# Patient Record
Sex: FEMALE | Race: WHITE | NOT HISPANIC OR LATINO | Employment: FULL TIME | URBAN - METROPOLITAN AREA
[De-identification: names, ages, dates, MRNs, and addresses within clinical notes are randomized per-mention and may not be internally consistent; named-entity substitution may affect disease eponyms.]

---

## 2017-01-19 ENCOUNTER — ALLIED HEALTH/NURSE VISIT (OUTPATIENT)
Dept: CARDIOLOGY | Facility: CLINIC | Age: 62
End: 2017-01-19
Payer: COMMERCIAL

## 2017-01-19 DIAGNOSIS — Z95.0 CARDIAC PACEMAKER IN SITU: Primary | ICD-10-CM

## 2017-01-19 PROCEDURE — 93294 REM INTERROG EVL PM/LDLS PM: CPT | Performed by: INTERNAL MEDICINE

## 2017-01-19 PROCEDURE — 93296 REM INTERROG EVL PM/IDS: CPT | Performed by: INTERNAL MEDICINE

## 2017-01-20 NOTE — PROGRESS NOTES
Medtronic Adapta ADDRL1 (D) Remote PPM Device Check  AP: 24% : 1%  Mode: AAI <=> DDD        Presenting Rhythm: SR, vent rate 74bpm  Heart Rate: adequate heart rates per histogram  Sensing: stable    Pacing Threshold: stable    Impedance: stable  Battery Status: 8 - 11.5 years remaining  Atrial Arrhythmia: none  Ventricular Arrhythmia: none     Care Plan: F/U [facility] q 3 months. Mailed letter with results and next transmission date. Rosita ESPINOZA

## 2017-04-20 ENCOUNTER — ALLIED HEALTH/NURSE VISIT (OUTPATIENT)
Dept: CARDIOLOGY | Facility: CLINIC | Age: 62
End: 2017-04-20
Payer: COMMERCIAL

## 2017-04-20 DIAGNOSIS — Z95.0 CARDIAC PACEMAKER IN SITU: Primary | ICD-10-CM

## 2017-04-20 PROCEDURE — 93296 REM INTERROG EVL PM/IDS: CPT | Performed by: INTERNAL MEDICINE

## 2017-04-20 PROCEDURE — 93294 REM INTERROG EVL PM/LDLS PM: CPT | Performed by: INTERNAL MEDICINE

## 2017-04-20 NOTE — MR AVS SNAPSHOT
After Visit Summary   4/20/2017    Emmy Sawyer    MRN: [id number]           Patient Information     Date Of Birth          1955        Visit Information        Provider Department      4/20/2017 3:30 PM TINOCO TECH1 [facility] AT [place]        Today's Diagnoses     Cardiac pacemaker in situ    -  1       Follow-ups after your visit        Who to contact     If you have questions or need follow up information about today's clinic visit or your schedule please contact [facility] directly at [phone].  Normal or non-critical lab and imaging results will be communicated to you by Tasktop [employer]hart, letter or phone within 4 business days after the clinic has received the results. If you do not hear from us within 7 days, please contact the clinic through Transmode Systemst or phone. If you have a critical or abnormal lab result, we will notify you by phone as soon as possible.  Submit refill requests through Fididel or call your pharmacy and they will forward the refill request to us. Please allow 3 business days for your refill to be completed.          Additional Information About Your Visit        MyChart Information     Fididel gives you secure access to your electronic health record. If you see a primary care provider, you can also send messages to your care team and make appointments. If you have questions, please call your primary care clinic.  If you do not have a primary care provider, please call [phone] and they will assist you.        Care EveryWhere ID     This is your Care EveryWhere ID. This could be used by other organizations to access your [place] medical records  [id number]         Blood Pressure from Last 3 Encounters:   09/02/16 120/78   04/01/16 136/77   12/03/15 128/80    Weight from Last 3 Encounters:   09/02/16 106.1 kg (234 lb)   11/12/15 108.9 kg (240 lb)   08/25/15 110.7 kg (244 lb)              We Performed  the Following     INTERROGATION DEVICE EVAL REMOTE, PACER/ICD (42040)     PM DEVICE INTERROGATE REMOTE (18920)        Primary Care Provider Office Phone # Fax #    Hayley Hays [phone]       [facility] [address] [facility] [address]        Thank you!     Thank you for choosing [facility] PHYSIC[facility] HEART AT [place]  for your care. Our goal is always to provide you with excellent care. Hearing back from our patients is one way we can continue to improve our services. Please take a few minutes to complete the written survey that you may receive in the mail after your visit with us. Thank you!             Your Updated Medication List - Protect others around you: Learn how to safely use, store and throw away your medicines at www.disposemymeds.org.          This list is accurate as of: 4/20/17 11:59 PM.  Always use your most recent med list.                   Brand Name Dispense Instructions for use    CRESTOR 20 MG tablet   Generic drug:  rosuvastatin      Take 10 mg by mouth daily.       metoprolol 25 MG 24 hr tablet    TOPROL-XL     Take 25 mg by mouth At Bedtime.       omeprazole 20 MG CR capsule    priLOSEC     Take 20 mg by mouth daily.       order for DME      CPAP every night       vitamin D 2000 UNITS Caps      Take 1 tablet by mouth.       ZOLOFT 50 MG tablet   Generic drug:  sertraline      Take 50 mg by mouth At Bedtime.

## 2017-04-21 NOTE — PROGRESS NOTES
Medtronic Adapta ADDRL1 (D) Remote PPM Device Check  AP: 25% : 1%  Mode: AAI <=> DDD        Presenting Rhythm: SR, vent rate 90bpm  Heart Rate: adequate heart rates per histogram  Sensing: stable    Pacing Threshold: stable    Impedance: RA: stable RV: increased from last check (473ohms to 716ohms)  Battery Status: 8.5 - 12.5 years remaining  Atrial Arrhythmia: none  Ventricular Arrhythmia: none     Care Plan: Reviewed lead findings with Nathan ESCOBAR and will continue to monitor for now. F/U scheduled q 3 months. Mailed letter with results and next transmission date. Rosita ESPINOZA

## 2017-06-03 ENCOUNTER — HEALTH MAINTENANCE LETTER (OUTPATIENT)
Age: 62
End: 2017-06-03

## 2017-07-18 ENCOUNTER — HOSPITAL ENCOUNTER (OUTPATIENT)
Dept: MAMMOGRAPHY | Facility: CLINIC | Age: 62
Discharge: HOME | End: 2017-07-18
Attending: FAMILY MEDICINE | Admitting: FAMILY MEDICINE
Payer: COMMERCIAL

## 2017-07-18 DIAGNOSIS — Z12.31 VISIT FOR SCREENING MAMMOGRAM: ICD-10-CM

## 2017-07-18 PROCEDURE — 77063 BREAST TOMOSYNTHESIS BI: CPT

## 2017-07-27 ENCOUNTER — ALLIED HEALTH/NURSE VISIT (OUTPATIENT)
Dept: CARDIOLOGY | Facility: CLINIC | Age: 62
End: 2017-07-27
Payer: COMMERCIAL

## 2017-07-27 DIAGNOSIS — Z95.0 CARDIAC PACEMAKER IN SITU: Primary | ICD-10-CM

## 2017-07-27 PROCEDURE — 93296 REM INTERROG EVL PM/IDS: CPT | Performed by: INTERNAL MEDICINE

## 2017-07-27 NOTE — MR AVS SNAPSHOT
After Visit Summary   7/27/2017    Emmy Sawyer    MRN: [id number]           Patient Information     Date Of Birth          1955        Visit Information        Provider Department      7/27/2017 4:45 PM TINOCO TECH1 [facility]        Today's Diagnoses     Cardiac pacemaker in situ    -  1       Follow-ups after your visit        Additional Services     Follow-Up with Device Clinic                 Your next 10 appointments already scheduled     Jul 27, 2017  4:45 PM CDT   Remote PPM Check with TINOCO TECH1   [facility] ([facility])    [address]  [place] [id number] [phone]           This appointment is for a remote check of your pacemaker.  This is not an appointment at the office.            Sep 06, 2017  8:30 AM CDT   Return Visit with Agustin Madden MD   [facility] ([facility])    [address]  [place] [address] [phone]              Future tests that were ordered for you today     Open Future Orders        Priority Expected Expires Ordered    Follow-Up with Device Clinic Routine 10/27/2017 7/27/2018 7/27/2017            Who to contact     If you have questions or need follow up information about today's clinic visit or your schedule please contact [facility] directly at [phone].  Normal or non-critical lab and imaging results will be communicated to you by MyChart, letter or phone within 4 business days after the clinic has received the results. If you do not hear from us within 7 days, please contact the clinic through MyChart or phone. If you have a critical or abnormal lab result, we will notify you by phone as soon as possible.  Submit refill requests through Adwings or call your pharmacy and they will forward the refill request to us. Please allow  3 business days for your refill to be completed.          Additional Information About Your Visit        MyChart Information     "[employer]"hart gives you secure access to your electronic health record. If you see a primary care provider, you can also send messages to your care team and make appointments. If you have questions, please call your primary care clinic.  If you do not have a primary care provider, please call [phone] and they will assist you.        Care EveryWhere ID     This is your Care EveryWhere ID. This could be used by other organizations to access your [place] medical records  [id number]         Blood Pressure from Last 3 Encounters:   09/02/16 120/78   04/01/16 136/77   12/03/15 128/80    Weight from Last 3 Encounters:   09/02/16 106.1 kg (234 lb)   11/12/15 108.9 kg (240 lb)   08/25/15 110.7 kg (244 lb)              We Performed the Following     INTERROGATION DEVICE EVAL REMOTE, PACER/ICD (88183)     PM DEVICE INTERROGATE REMOTE (06636)        Primary Care Provider Office Phone # Fax #    Hayley Crystal Hays [phone]       [facility] [address] [address] [address]        Equal Access to Services     CHALINO MAYA : Hadii aad ku hadasho Soomaali, waaxda luqadaha, qaybta kaalmada adeegyada, selam harding. So [facility] [phone].    ATENCIÓN: Si habla español, tiene a king disposición servicios gratuitos de asistencia lingüística. Llame al [phone].    We comply with applicable federal civil rights laws and [place] laws. We do not discriminate on the basis of race, color, national origin, age, disability sex, sexual orientation or gender identity.            Thank you!     Thank you for choosing [facility] PHYSICIANS HEART AT [place]  for your care. Our goal is always to provide you with excellent care. Hearing back from our patients is one way we can continue to improve our services. Please take a few minutes to complete  the written survey that you may receive in the mail after your visit with us. Thank you!             Your Updated Medication List - Protect others around you: Learn how to safely use, store and throw away your medicines at www.disposemymeds.org.          This list is accurate as of: 7/27/17  8:25 AM.  Always use your most recent med list.                   Brand Name Dispense Instructions for use Diagnosis    CRESTOR 20 MG tablet   Generic drug:  rosuvastatin      Take 10 mg by mouth daily.        metoprolol 25 MG 24 hr tablet    TOPROL-XL     Take 25 mg by mouth At Bedtime.        omeprazole 20 MG CR capsule    priLOSEC     Take 20 mg by mouth daily.        order for DME      CPAP every night        vitamin D 2000 UNITS Caps      Take 1 tablet by mouth.        ZOLOFT 50 MG tablet   Generic drug:  sertraline      Take 50 mg by mouth At Bedtime.

## 2017-07-27 NOTE — PROGRESS NOTES
Medtronic Adapta (D) Remote PPM Device Check  AP: 25 % : 2 %  Mode: AAI<->DDD        Presenting Rhythm: AS/VS, [id number]  Heart Rate: Adequate rates per histogram  Sensing: Stable    Pacing Threshold: Stable    Impedance: Stable  Battery Status: 8-12.5 years  Atrial Arrhythmia: None  Ventricular Arrhythmia: 1 ventricular high rate. Marker only EGM shows As=Vs for PAT lasting 8 beats, rates 190bpm. Reviewed with SHAWN Doherty.     Care Plan: F/u annual threshold in 3 months. LM with results.  LORIN PhelanT

## 2017-09-06 ENCOUNTER — OFFICE VISIT (OUTPATIENT)
Dept: CARDIOLOGY | Facility: CLINIC | Age: 62
End: 2017-09-06
Attending: INTERNAL MEDICINE
Payer: COMMERCIAL

## 2017-09-06 VITALS
DIASTOLIC BLOOD PRESSURE: 64 MMHG | HEIGHT: 63 IN | HEART RATE: 60 BPM | WEIGHT: 238 LBS | BODY MASS INDEX: 42.17 KG/M2 | SYSTOLIC BLOOD PRESSURE: 118 MMHG

## 2017-09-06 DIAGNOSIS — I44.2 ATRIOVENTRICULAR BLOCK, COMPLETE (H): Primary | ICD-10-CM

## 2017-09-06 PROCEDURE — 99214 OFFICE O/P EST MOD 30 MIN: CPT | Performed by: INTERNAL MEDICINE

## 2017-09-06 NOTE — MR AVS SNAPSHOT
After Visit Summary   9/6/2017    Emmy Sawyer    MRN: [id number]           Patient Information     Date Of Birth          1955        Visit Information        Provider Department      9/6/2017 8:30 AM Agustin Madden MD [facility] HEART AT [place]        Today's Diagnoses     Atrioventricular block, complete (H)    -  1       Follow-ups after your visit        Additional Services     Follow-Up with Cardiologist                 Your next 10 appointments already scheduled     Oct 25, 2017  8:10 AM CDT   Pacemaker Check with RU DCR2   [facility] ([facility])    [address]  [place] [address] [phone]              Future tests that were ordered for you today     Open Future Orders        Priority Expected Expires Ordered    Follow-Up with Cardiologist Routine 9/6/2018 9/7/2018 9/6/2017            Who to contact     If you have questions or need follow up information about today's clinic visit or your schedule please contact [facility] directly at [phone].  Normal or non-critical lab and imaging results will be communicated to you by Message Bushart, letter or phone within 4 business days after the clinic has received the results. If you do not hear from us within 7 days, please contact the clinic through [employer] or phone. If you have a critical or abnormal lab result, we will notify you by phone as soon as possible.  Submit refill requests through Traxian or call your pharmacy and they will forward the refill request to us. Please allow 3 business days for your refill to be completed.          Additional Information About Your Visit        Message Bushart Information     Traxian gives you secure access to your electronic health record. If you see a primary care provider, you can also send messages to your care team and make appointments. If you have questions,  "please call your primary care clinic.  If you do not have a primary care provider, please call [phone] and they will assist you.        Care EveryWhere ID     This is your Care EveryWhere ID. This could be used by other organizations to access your [place] medical records  [id number]        Your Vitals Were     Pulse Height BMI (Body Mass Index)             60 1.6 m (5' 3\") 42.16 kg/m2          Blood Pressure from Last 3 Encounters:   09/06/17 118/64   09/02/16 120/78   04/01/16 136/77    Weight from Last 3 Encounters:   09/06/17 108 kg (238 lb)   09/02/16 106.1 kg (234 lb)   11/12/15 108.9 kg (240 lb)              We Performed the Following     Follow-Up with Cardiologist        Primary Care Provider Office Phone # Fax #    Hayley Crystal Hays [phone]       [facility] [address] [address]        Equal Access to Services     MATTEO MAYA : Hadii aad ku hadasho Soomaali, waaxda luqadaha, qaybta kaalmada adeegyada, waxay idiin hayalexn harlan bello . So [facility] [phone].    ATENCIÓN: Si habla español, tiene a king disposición servicios gratuitos de asistencia lingüística. Ll[facility] [phone].    We comply with applicable federal civil rights laws and [place] laws. We do not discriminate on the basis of race, color, national origin, age, disability sex, sexual orientation or gender identity.            Thank you!     Thank you for choosing [facility] PHYSICIANS HEART AT [place]  for your care. Our goal is always to provide you with excellent care. Hearing back from our patients is one way we can continue to improve our services. Please take a few minutes to complete the written survey that you may receive in the mail after your visit with us. Thank you!             Your Updated Medication List - Protect others around you: Learn how to safely use, store and throw away your medicines at www.disposemymeds.org.          This list is accurate as of: " 9/6/17  9:15 AM.  Always use your most recent med list.                   Brand Name Dispense Instructions for use Diagnosis    CRESTOR 20 MG tablet   Generic drug:  rosuvastatin      Take 10 mg by mouth daily.        metoprolol 25 MG 24 hr tablet    TOPROL-XL     Take 25 mg by mouth At Bedtime.        omeprazole 20 MG CR capsule    priLOSEC     Take 20 mg by mouth daily.        order for DME      CPAP every night        vitamin D 2000 UNITS Caps      Take 1 tablet by mouth.        ZOLOFT 50 MG tablet   Generic drug:  sertraline      Take 50 mg by mouth At Bedtime.

## 2017-09-06 NOTE — LETTER
9/6/2017    Hayley Hays MD  [employer]   [address] [address]    RE: Emmy Sawyer       Dear Colleague,    I had the pleasure of seeing Emym Sawyer in the [facility].    I had the pleasure of following up on our mutual patient, Emmy Sawyer.  She is a delightful 62-year-old woman.  As you know, we met her decade and a half ago when she presented with chest pain which was either noncardiac or possibly vasospasm or possibly esophageal symptoms.  Coronary angiogram showed normal heart and she had second-degree AV block.  She had a pacemaker placed.        We reviewed her pacer chart today.  Interestingly for years now, she has been using the atrial channel about 25% of the time and only using the ventricular channel a few percent of the time, even though one would expect a more ventricular pacing.  It suggested her AV conduction has improved.        She also is bothered by the PVCs.  We tried hard not to treat them above and beyond low dose beta blocker.  I told her if she is having a bad day, she can take an additional metoprolol.  I did review with her the flecainide trial and I explained to her that there is a black box warning for treating asymptomatic PVCs, although that applied only to patients who have had a heart attack and she probably would be okay with a type 1C antiarrhythmic, but I would only do that if her PVCs become more and more bothersome or she becomes more and more lightheaded when they are grouped together.  According the pacer she is not having any ventricular tachycardia.        We also briefly talked about EP study with PVC ablation focally if necessary.        I also was able access your lab tests.  I went through the cardiovascular 10-year risk calculator with her.  I know that she has impaired fasting glucose and I see that the hemoglobin A1c is only mildly elevated at 6.1%.  If we choose to call this diabetes, then her  10-year risk is actually 7.5%, which puts her at higher risk.  Again this is making the assumption that we are going to call it impaired fasting glucose diabetes.  In that case, I would actually try to get the LDL closer to 100 with a goal of ideally 70.  I know I sent a letter to you a couple years ago talking about this and her LDL actually did improve from that note 2 years ago to now.  I am going to have the patient give you a call and seek your opinions if you want to try increasing Crestor to see if we can get the LDL a little bit closer to 100.        Otherwise, I do not have any additional recommendations now.  She also asked about aspirin.  I reviewed with her the Physicians' Health Study and the Nurse Study.  The Nurse Study suggested little benefit for aspirin in female patient was under 65 unless they have additional risk factors.  In her case, she does have cholesterol and blood pressure and now perhaps diabetes, and so we could make a case for a low dose baby aspirin.        Today's visit ended up being 45 minutes, greater than 50% counseling.      I will see her back in 1 year for a pacer check and I will kindly ask your opinion and have you weigh in on the idea of an aspirin and higher dose Crestor.      Thank you for allowing me to see Ms. Sawyer.     Sincerely,    Agustin Madden MD     [facility]

## 2017-09-06 NOTE — PROGRESS NOTES
HISTORY OF PRESENT ILLNESS:  I had the pleasure of following up on our mutual patient, Emmy Sawyer.  She is a delightful 62-year-old woman.  As you know, we met her decade and a half ago when she presented with chest pain which was either noncardiac or possibly vasospasm or possibly esophageal symptoms.  Coronary angiogram showed normal heart and she had second-degree AV block.  She had a pacemaker placed.        We reviewed her pacer chart today.  Interestingly for years now, she has been using the atrial channel about 25% of the time and only using the ventricular channel a few percent of the time, even though one would expect a more ventricular pacing.  It suggested her AV conduction has improved.        She also is bothered by the PVCs.  We tried hard not to treat them above and beyond low dose beta blocker.  I told her if she is having a bad day, she can take an additional metoprolol.  I did review with her the flecainide trial and I explained to her that there is a black box warning for treating asymptomatic PVCs, although that applied only to patients who have had a heart attack and she probably would be okay with a type 1C antiarrhythmic, but I would only do that if her PVCs become more and more bothersome or she becomes more and more lightheaded when they are grouped together.  According the pacer she is not having any ventricular tachycardia.        We also briefly talked about EP study with PVC ablation focally if necessary.        I also was able access your lab tests.  I went through the cardiovascular 10-year risk calculator with her.  I know that she has impaired fasting glucose and I see that the hemoglobin A1c is only mildly elevated at 6.1%.  If we choose to call this diabetes, then her 10-year risk is actually 7.5%, which puts her at higher risk.  Again this is making the assumption that we are going to call it impaired fasting glucose diabetes.  In that case, I would actually try to get the  LDL closer to 100 with a goal of ideally 70.  I know I sent a letter to you a couple years ago talking about this and her LDL actually did improve from that note 2 years ago to now.  I am going to have the patient give you a call and seek your opinions if you want to try increasing Crestor to see if we can get the LDL a little bit closer to 100.        Otherwise, I do not have any additional recommendations now.  She also asked about aspirin.  I reviewed with her the Physicians' Health Study and the Nurse Study.  The Nurse Study suggested little benefit for aspirin in female patient was under 65 unless they have additional risk factors.  In her case, she does have cholesterol and blood pressure and now perhaps diabetes, and so we could make a case for a low dose baby aspirin.        Today's visit ended up being 45 minutes, greater than 50% counseling.      I will see her back in 1 year for a pacer check and I will kindly ask your opinion and have you weigh in on the idea of an aspirin and higher dose Crestor.      Thank you for allowing me to see Ms. Shearer.      cc:      [place] [address]         GEOVANNA ALANIZ MD             D: 2017 09:15   T: 2017 11:10   MT: TUNG      Name:     DEBORAH SHEARER   MRN:      [id number]        Account:      [id number]   :      1955           Service Date: 2017      Document: [id number]

## 2017-09-06 NOTE — PROGRESS NOTES
HPI and Plan:   See dictation    Orders Placed This Encounter   Procedures     Follow-Up with Cardiologist     No orders of the defined types were placed in this encounter.    There are no discontinued medications.      Encounter Diagnosis   Name Primary?     Atrioventricular block, complete (H) Yes       CURRENT MEDICATIONS:  Current Outpatient Prescriptions   Medication Sig Dispense Refill     order for DME CPAP every night       rosuvastatin (CRESTOR) 20 MG tablet Take 10 mg by mouth daily.       metoprolol (TOPROL-XL) 25 MG 24 hr tablet Take 25 mg by mouth At Bedtime.       omeprazole (PRILOSEC) 20 MG capsule Take 20 mg by mouth daily.       Cholecalciferol (VITAMIN D) 2000 UNIT CAPS Take 1 tablet by mouth.       sertraline (ZOLOFT) 50 MG tablet Take 50 mg by mouth At Bedtime.         ALLERGIES     Allergies   Allergen Reactions     Penicillins Rash       PAST MEDICAL HISTORY:  Past Medical History:   Diagnosis Date     Angina pectoris     normal cor artery-?syndrome X, ?Prinzmetal's     Anxiety      Arrhythmia     PVC     Costochondritis      Depression      GERD (gastroesophageal reflux disease)     normal EGD     Hyperlipidaemia      Hypertension      Impaired fasting glucose      Migraines      Pacemaker     Medtronic manny mclaughlin pacemaker serial [id number]     Second degree heart block 2003    Medtronic manny mclaughlin pacemaker serial [id number]     Shingles      Sleep apnea     CPAP       PAST SURGICAL HISTORY:  Past Surgical History:   Procedure Laterality Date     DILATION AND CURETTAGE  1/13/2012    Procedure:DILATION AND CURETTAGE; DILATION AND CURETTAGE ; Surgeon:NIKI QUEEN; Location:[facility] OR     H PERM PACER DBLE LEAD       IMPLANT PACEMAKER  2003     LAPAROSCOPIC HYSTERECTOMY TOTAL  2/13/2012    Procedure:LAPAROSCOPIC HYSTERECTOMY TOTAL; LAPAROSCOPIC TOTAL HYSTERECTOMY , BILATERAL SALPINGO-OOPHERECTOMY; Surgeon:NIKI QUEEN; Location:[facility] OR     TUBAL LIGATION         FAMILY HISTORY:  Family History  "  Problem Relation Age of Onset     Hypertension Mother      Hyperlipidemia Mother      Hypertension Father      DIABETES Father      Hyperlipidemia Father      Myocardial Infarction Father 84     Myocardial Infarction Maternal Grandfather      Myocardial Infarction Paternal Grandmother      HEART DISEASE Daughter      minor MVP       SOCIAL HISTORY:  Social History     Social History     Marital status:      Spouse name: N/A     Number of children: N/A     Years of education: N/A     Social History Main Topics     Smoking status: Former Smoker     Quit date: 1/9/2008     Smokeless tobacco: Never Used     Alcohol use Yes      Comment: 2 drinks a month     Drug use: No     Sexual activity: Not Asked     Other Topics Concern     Caffeine Concern No     1-2 cups per day     Sleep Concern Yes     sleep apnea , cpap     Weight Concern No     Special Diet No     Exercise Yes     some walking 2-3 days a week     Seat Belt Yes     Social History Narrative       Review of Systems:  Skin:  Negative for     Eyes:  Negative for    ENT:  Negative for    Respiratory:  Negative for sleep apnea;CPAP  Cardiovascular:    Positive for;lightheadedness;fatigue  Gastroenterology: Positive for reflux  Genitourinary:  Negative for    Musculoskeletal:  Negative for    Neurologic:  Positive for numbness or tingling of hands  Psychiatric:  Negative for    Heme/Lymph/Imm:  Negative    Endocrine:  Positive for      Physical Exam:  Vitals: /64  Pulse 60  Ht 1.6 m (5' 3\")  Wt 108 kg (238 lb)  BMI 42.16 kg/m2    Constitutional:  cooperative, alert and oriented, well developed, well nourished, in no acute distress        Skin:  warm and dry to the touch, no apparent skin lesions or masses noted        Head:  normocephalic, no masses or lesions        Eyes:  pupils equal and round, conjunctivae and lids unremarkable, sclera white, no xanthalasma, EOMS intact, no nystagmus        ENT:  no pallor or cyanosis, dentition good    "     Neck:  carotid pulses are full and equal bilaterally, JVP normal, no carotid bruit, no thyromegaly        Chest:  normal breath sounds, clear to auscultation, normal A-P diameter, normal symmetry, normal respiratory excursion, no use of accessory muscles          Cardiac: regular rhythm, normal S1/S2, no S3 or S4, apical impulse not displaced, no murmurs, gallops or rubs                  Abdomen:  abdomen soft, non-tender, BS normoactive, no mass, no HSM, no bruits obese      Vascular: pulses full and equal, no bruits auscultated                                        Extremities and Back:  no deformities, clubbing, cyanosis, erythema observed   bilateral LE edema;trace          Neurological:  affect appropriate, oriented to time, person and place          Recent Lab Results:  LIPID RESULTS:  Lab Results   Component Value Date    CHOL 232 (A) 06/19/2015    HDL 57 06/19/2015     06/19/2015    TRIG 188 06/19/2015    CHOLHDLRATIO 3.68 05/30/2014       LIVER ENZYME RESULTS:  Lab Results   Component Value Date    AST 16 04/01/2016    ALT 29 04/01/2016       CBC RESULTS:  Lab Results   Component Value Date    WBC 9.1 04/01/2016    RBC 4.48 04/01/2016    HGB 12.5 04/01/2016    HCT 38.7 04/01/2016    MCV 86 04/01/2016    MCH 27.9 04/01/2016    MCHC 32.3 04/01/2016    RDW 13.3 04/01/2016     04/01/2016       BMP RESULTS:  Lab Results   Component Value Date     04/01/2016    POTASSIUM 4.5 04/01/2016    CHLORIDE 106 04/01/2016    CO2 28 04/01/2016    ANIONGAP 5 04/01/2016    GLC 90 04/01/2016    BUN 13 04/01/2016    CR 0.76 04/01/2016    GFRESTIMATED 64 04/01/2016    GFRESTBLACK 78 04/01/2016    REGLA 9.0 04/01/2016        A1C RESULTS:  No results found for: A1C    INR RESULTS:  Lab Results   Component Value Date    INR 0.98 12/06/2011           CC  Agustin Madden MD  [address] [place]  [place] [address]

## 2017-10-25 ENCOUNTER — ALLIED HEALTH/NURSE VISIT (OUTPATIENT)
Dept: CARDIOLOGY | Facility: CLINIC | Age: 62
End: 2017-10-25
Payer: COMMERCIAL

## 2017-10-25 DIAGNOSIS — Z95.0 CARDIAC PACEMAKER IN SITU: ICD-10-CM

## 2017-10-25 PROCEDURE — 93280 PM DEVICE PROGR EVAL DUAL: CPT | Performed by: INTERNAL MEDICINE

## 2017-10-25 NOTE — MR AVS SNAPSHOT
After Visit Summary   10/25/2017    Emmy Sawyer    MRN: [id number]           Patient Information     Date Of Birth          1955        Visit Information        Provider Department      10/25/2017 8:10 AM RU DCR2 [facility]        Today's Diagnoses     Cardiac pacemaker in situ           Follow-ups after your visit        Your next 10 appointments already scheduled     Jan 22, 2018  9:00 AM CST   Remote PPM Check with TINOCO TECH1   [facility] ([facility])    [address] [place], [place] [id number]  [facility] [address] [phone]           This appointment is for a remote check of your pacemaker.  This is not an appointment at the office.              Who to contact     If you have questions or need follow up information about today's clinic visit or your schedule please contact [facility] directly at [phone].  Normal or non-critical lab and imaging results will be communicated to you by [employer], letter or phone within 4 business days after the clinic has received the results. If you do not hear from us within 7 days, please contact the clinic through [employer] or phone. If you have a critical or abnormal lab result, we will notify you by phone as soon as possible.  Submit refill requests through [employer] or call your pharmacy and they will forward the refill request to us. Please allow 3 business days for your refill to be completed.          Additional Information About Your Visit        MyChart Information     [employer] gives you secure access to your electronic health record. If you see a primary care provider, you can also send messages to your care team and make appointments. If you have questions, please call your primary care clinic.  If you do not have a primary care provider, please call [phone] and they will assist you.        Care EveryWhere ID      This is your Care EveryWhere ID. This could be used by other organizations to access your [place] medical records  [id number]         Blood Pressure from Last 3 Encounters:   09/06/17 118/64   09/02/16 120/78   04/01/16 136/77    Weight from Last 3 Encounters:   09/06/17 108 kg (238 lb)   09/02/16 106.1 kg (234 lb)   11/12/15 108.9 kg (240 lb)              We Performed the Following     Follow-Up with Device Clinic     PM DEVICE PROGRAMMING EVAL, DUAL LEAD PACER (44374)        Primary Care Provider Office Phone # Fax #    Hayley Hays [phone]       [facility] [address] [facility] [address]        Equal Access to Services     CHALINO MAYA : Hadii [place] gold hadadilsnoo Soronit, waaxda luqadaha, qaybta kaalmada adeegyada, selam bello . So [facility] [phone].    ATENCIÓN: Si habla español, tiene a king disposición servicios gratuitos de asistencia lingüística. Llame al [phone].    We comply with applicable federal civil rights laws and [place] laws. We do not discriminate on the basis of race, color, national origin, age, disability, sex, sexual orientation, or gender identity.            Thank you!     Thank you for choosing [facility]  for your care. Our goal is always to provide you with excellent care. Hearing back from our patients is one way we can continue to improve our services. Please take a few minutes to complete the written survey that you may receive in the mail after your visit with us. Thank you!             Your Updated Medication List - Protect others around you: Learn how to safely use, store and throw away your medicines at www.disposemymeds.org.          This list is accurate as of: 10/25/17  8:12 AM.  Always use your most recent med list.                   Brand Name Dispense Instructions for use Diagnosis    CRESTOR 20 MG tablet   Generic drug:  rosuvastatin      Take 10 mg by mouth  daily.        metoprolol 25 MG 24 hr tablet    TOPROL-XL     Take 25 mg by mouth At Bedtime.        omeprazole 20 MG CR capsule    priLOSEC     Take 20 mg by mouth daily.        order for DME      CPAP every night        vitamin D 2000 UNITS Caps      Take 1 tablet by mouth.        ZOLOFT 50 MG tablet   Generic drug:  sertraline      Take 50 mg by mouth At Bedtime.

## 2017-10-25 NOTE — PROGRESS NOTES
Medtronic Adapta Pacemaker Device Check  AP: 25 % : 2 %  Mode: AAI- DDD        Underlying Rhythm: SR with PVCs  Heart Rate: Adequate variation per histogram   Sensing: stable    Pacing Threshold: stable   Impedance: stable  Battery Status: 10 years  Atrial Arrhythmia: none  Ventricular Arrhythmia: none  Setting Change: none    Care Plan: f/u 3 months remote PPM check. Shiraz

## 2018-01-22 ENCOUNTER — ALLIED HEALTH/NURSE VISIT (OUTPATIENT)
Dept: CARDIOLOGY | Facility: CLINIC | Age: 63
End: 2018-01-22
Payer: COMMERCIAL

## 2018-01-22 DIAGNOSIS — Z95.0 CARDIAC PACEMAKER IN SITU: Primary | ICD-10-CM

## 2018-01-22 PROCEDURE — 93294 REM INTERROG EVL PM/LDLS PM: CPT | Performed by: INTERNAL MEDICINE

## 2018-01-22 PROCEDURE — 93296 REM INTERROG EVL PM/IDS: CPT | Performed by: INTERNAL MEDICINE

## 2018-01-22 NOTE — MR AVS SNAPSHOT
After Visit Summary   1/22/2018    Emmy Sawyer    MRN: [id number]           Patient Information     Date Of Birth          1955        Visit Information        Provider Department      1/22/2018 9:00 AM TINOCO TECH1 [facility]        Today's Diagnoses     Cardiac pacemaker in situ    -  1       Follow-ups after your visit        Who to contact     If you have questions or need follow up information about today's clinic visit or your schedule please contact [facility] directly at [phone].  Normal or non-critical lab and imaging results will be communicated to you by [employer], letter or phone within 4 business days after the clinic has received the results. If you do not hear from us within 7 days, please contact the clinic through [employer] or phone. If you have a critical or abnormal lab result, we will notify you by phone as soon as possible.  Submit refill requests through [employer] or call your pharmacy and they will forward the refill request to us. Please allow 3 business days for your refill to be completed.          Additional Information About Your Visit        MyChart Information     [employer] gives you secure access to your electronic health record. If you see a primary care provider, you can also send messages to your care team and make appointments. If you have questions, please call your primary care clinic.  If you do not have a primary care provider, please call [phone] and they will assist you.        Care EveryWhere ID     This is your Care EveryWhere ID. This could be used by other organizations to access your [place] medical records  [id number]         Blood Pressure from Last 3 Encounters:   09/06/17 118/64   09/02/16 120/78   04/01/16 136/77    Weight from Last 3 Encounters:   09/06/17 108 kg (238 lb)   09/02/16 106.1 kg (234 lb)   11/12/15 108.9 kg (240 lb)              We Performed the  Following     INTERROGATION DEVICE EVAL REMOTE, PACER/ICD (04870)     PM DEVICE INTERROGATE REMOTE (10242)        Primary Care Provider Office Phone # Fax #    Hayley Hays [phone]       [facility] [address] [facility] [address]        Equal Access to Services     [facility] Center[facility] : Hadii aad ku hadasho Soomaali, waaxda luqadaha, qaybta kaalmada adeegyada, waxay idiin hayaan adeeg khelianash la'aan . So [facility] [phone].    ATENCIÓN: Si habla español, tiene a king disposición servicios gratuitos de asistencia lingüística. Broame al [phone].    We comply with applicable federal civil rights laws and [place] laws. We do not discriminate on the basis of race, color, national origin, age, disability, sex, sexual orientation, or gender identity.            Thank you!     Thank you for choosing [facility]  for your care. Our goal is always to provide you with excellent care. Hearing back from our patients is one way we can continue to improve our services. Please take a few minutes to complete the written survey that you may receive in the mail after your visit with us. Thank you!             Your Updated Medication List - Protect others around you: Learn how to safely use, store and throw away your medicines at www.disposemymeds.org.          This list is accurate as of: 1/22/18 11:57 AM.  Always use your most recent med list.                   Brand Name Dispense Instructions for use Diagnosis    CRESTOR 20 MG tablet   Generic drug:  rosuvastatin      Take 10 mg by mouth daily.        metoprolol succinate 25 MG 24 hr tablet    TOPROL-XL     Take 25 mg by mouth At Bedtime.        omeprazole 20 MG CR capsule    priLOSEC     Take 20 mg by mouth daily.        order for DME      CPAP every night        vitamin D 2000 UNITS Caps      Take 1 tablet by mouth.        ZOLOFT 50 MG tablet   Generic drug:  sertraline      Take 50 mg by mouth At Bedtime.

## 2018-01-22 NOTE — PROGRESS NOTES
Medtronic Adapta (D) Remote PPM Device Check  AP: 26 % : 1 %  Mode: AAI<->DDD        Presenting Rhythm: AS/VS, rates 67 bpm  Heart Rate: Adequate rates per histogram  Sensing: Stable    Pacing Threshold: Stable    Impedance: Stable  Battery Status: 7.5-12 years  Atrial Arrhythmia: None  Ventricular Arrhythmia: None     Care Plan: F/u PPM Carelink q 3 months. LM with results. LORIN PhelanT

## 2018-04-30 ENCOUNTER — ALLIED HEALTH/NURSE VISIT (OUTPATIENT)
Dept: CARDIOLOGY | Facility: CLINIC | Age: 63
End: 2018-04-30
Payer: COMMERCIAL

## 2018-04-30 DIAGNOSIS — Z95.0 CARDIAC PACEMAKER IN SITU: Primary | ICD-10-CM

## 2018-04-30 PROCEDURE — 93296 REM INTERROG EVL PM/IDS: CPT | Performed by: INTERNAL MEDICINE

## 2018-04-30 PROCEDURE — 93294 REM INTERROG EVL PM/LDLS PM: CPT | Performed by: INTERNAL MEDICINE

## 2018-04-30 NOTE — PROGRESS NOTES
Medtronic Adapta (D) Remote PPM Device Check  AP: 25 % : 2 %  Mode: AAI<->DDD        Presenting Rhythm: AS/VS, rates 59-69bpm  Heart Rate: Adequate rates per histogram  Sensing: Stable    Pacing Threshold: Stable    Impedance: Stable  Battery Status: 7.5-11.5 years  Atrial Arrhythmia: None  Ventricular Arrhythmia: None     Care Plan: F/u PPM Carelink q 3 months. LM with results. LORIN PhelanT

## 2018-04-30 NOTE — MR AVS SNAPSHOT
After Visit Summary   4/30/2018    Emmy Sawyer    MRN: [id number]           Patient Information     Date Of Birth          1955        Visit Information        Provider Department      4/30/2018 1:30 PM ALEJANDRINA PAL [facility]        Today's Diagnoses     Cardiac pacemaker in situ    -  1       Follow-ups after your visit        Your next 10 appointments already scheduled     Apr 30, 2018  1:30 PM CDT   Remote PPM Check with ALEJANDRINA PAL   [facility]   [place] ([facility])    [address] [place] [address] [address]  [facility] [address] [phone] OPT 2           This appointment is for a remote check of your pacemaker.  This is not an appointment at the office.              Who to contact     If you have questions or need follow up information about today's clinic visit or your schedule please contact [facility] directly at [phone].  Normal or non-critical lab and imaging results will be communicated to you by [employer], letter or phone within 4 business days after the clinic has received the results. If you do not hear from us within 7 days, please contact the clinic through [employer] or phone. If you have a critical or abnormal lab result, we will notify you by phone as soon as possible.  Submit refill requests through [employer] or call your pharmacy and they will forward the refill request to us. Please allow 3 business days for your refill to be completed.          Additional Information About Your Visit        MyChart Information     [employer] gives you secure access to your electronic health record. If you see a primary care provider, you can also send messages to your care team and make appointments. If you have questions, please call your primary care clinic.  If you do not have a primary care provider, please call [phone] and they will assist you.        Care EveryWhere ID      This is your Care EveryWhere ID. This could be used by other organizations to access your [place] medical records  [id number]         Blood Pressure from Last 3 Encounters:   09/06/17 118/64   09/02/16 120/78   04/01/16 136/77    Weight from Last 3 Encounters:   09/06/17 108 kg (238 lb)   09/02/16 106.1 kg (234 lb)   11/12/15 108.9 kg (240 lb)              We Performed the Following     INTERROGATION DEVICE EVAL REMOTE, PACER/ICD (55013)     PM DEVICE INTERROGATE REMOTE (82199)        Primary Care Provider Office Phone # Fax #    Hayley Crystal Hays [phone]       [facility] [address] [facility] [address]        Equal Access to Services     CHALINO MAYA : Hadii alban[place] gold hadasho Soomaali, waaxda luqadaha, qaybta kaalmada adeegyada, waxyvette bello . So [facility] [phone].    ATENCIÓN: Si habla español, tiene a king disposición servicios gratuitos de asistencia lingüística. LlSelect [facility]ty [facility] [phone].    We comply with applicable federal civil rights laws and [place] laws. We do not discriminate on the basis of race, color, national origin, age, disability, sex, sexual orientation, or gender identity.            Thank you!     Thank you for choosing [facility]  for your care. Our goal is always to provide you with excellent care. Hearing back from our patients is one way we can continue to improve our services. Please take a few minutes to complete the written survey that you may receive in the mail after your visit with us. Thank you!             Your Updated Medication List - Protect others around you: Learn how to safely use, store and throw away your medicines at www.disposemymeds.org.          This list is accurate as of 4/30/18  8:48 AM.  Always use your most recent med list.                   Brand Name Dispense Instructions for use Diagnosis    CRESTOR 20 MG tablet   Generic drug:  rosuvastatin      Take 10 mg by mouth  daily.        metoprolol succinate 25 MG 24 hr tablet    TOPROL-XL     Take 25 mg by mouth At Bedtime.        omeprazole 20 MG CR capsule    priLOSEC     Take 20 mg by mouth daily.        order for DME      CPAP every night        vitamin D 2000 units Caps      Take 1 tablet by mouth.        ZOLOFT 50 MG tablet   Generic drug:  sertraline      Take 50 mg by mouth At Bedtime.

## 2018-07-20 ENCOUNTER — HOSPITAL ENCOUNTER (OUTPATIENT)
Dept: MAMMOGRAPHY | Facility: CLINIC | Age: 63
Discharge: HOME | End: 2018-07-20
Attending: FAMILY MEDICINE | Admitting: FAMILY MEDICINE
Payer: COMMERCIAL

## 2018-07-20 DIAGNOSIS — Z12.31 VISIT FOR SCREENING MAMMOGRAM: ICD-10-CM

## 2018-07-20 PROCEDURE — 77063 BREAST TOMOSYNTHESIS BI: CPT

## 2018-08-06 ENCOUNTER — ALLIED HEALTH/NURSE VISIT (OUTPATIENT)
Dept: CARDIOLOGY | Facility: CLINIC | Age: 63
End: 2018-08-06
Payer: COMMERCIAL

## 2018-08-06 DIAGNOSIS — Z95.0 CARDIAC PACEMAKER IN SITU: Primary | ICD-10-CM

## 2018-08-06 PROCEDURE — 93294 REM INTERROG EVL PM/LDLS PM: CPT | Performed by: INTERNAL MEDICINE

## 2018-08-06 PROCEDURE — 93296 REM INTERROG EVL PM/IDS: CPT | Performed by: INTERNAL MEDICINE

## 2018-08-06 NOTE — MR AVS SNAPSHOT
After Visit Summary   8/6/2018    Emmy Sawyer    MRN: [id number]           Patient Information     Date Of Birth          1955        Visit Information        Provider Department      8/6/2018 10:45 AM [facility] TE[facility]        Today's Diagnoses     Cardiac pacemaker in situ    -  1       Follow-ups after your visit        Additional Services     Follow-Up with Device Clinic                 Your next 10 appointments already scheduled     Sep 05, 2018  8:00 AM CDT   Return Visit with Agustin Madden MD   [facility] ([facility])    [address]  [facility] [place] [id number] [phone]              Future tests that were ordered for you today     Open Future Orders        Priority Expected Expires Ordered    Follow-Up with Device Clinic Routine 11/6/2018 8/6/2019 8/6/2018            Who to contact     If you have questions or need follow up information about today's clinic visit or your schedule please contact [facility] directly at [phone].  Normal or non-critical lab and imaging results will be communicated to you by [employer], letter or phone within 4 business days after the clinic has received the results. If you do not hear from us within 7 days, please contact the clinic through [employer] or phone. If you have a critical or abnormal lab result, we will notify you by phone as soon as possible.  Submit refill requests through [employer] or call your pharmacy and they will forward the refill request to us. Please allow 3 business days for your refill to be completed.          Additional Information About Your Visit        MyChart Information     [employer] gives you secure access to your electronic health record. If you see a primary care provider, you can also send messages to your care team and make appointments. If you have questions, please call  your primary care clinic.  If you do not have a primary care provider, please call [phone] and they will assist you.        Care EveryWhere ID     This is your Care EveryWhere ID. This could be used by other organizations to access your [place] medical records  [id number]         Blood Pressure from Last 3 Encounters:   09/06/17 118/64   09/02/16 120/78   04/01/16 136/77    Weight from Last 3 Encounters:   09/06/17 108 kg (238 lb)   09/02/16 106.1 kg (234 lb)   11/12/15 108.9 kg (240 lb)              We Performed the Following     INTERROGATION DEVICE EVAL REMOTE, PACER/ICD (01594)     PM DEVICE INTERROGATE REMOTE (10660)        Primary Care Provider Office Phone # Fax #    Hayley Crystal Hays [phone]       [facility] [address] [address] [facility] [address]        Equal Access to Services     MATTEO MAYA : Hadii aad ku hadasho Soomaali, waaxda luqadaha, qaybta kaalmada adeegyada, waxay idiin hayalexn harlan kharahelga bello . So [facility] [phone].    ATENCIÓN: Si habla español, tiene a king disposición servicios gratuitos de asistencia lingüística. Llame al [phone].    We comply with applicable federal civil rights laws and [place] laws. We do not discriminate on the basis of race, color, national origin, age, disability, sex, sexual orientation, or gender identity.            Thank you!     Thank you for choosing [facility]  for your care. Our goal is always to provide you with excellent care. Hearing back from our patients is one way we can continue to improve our services. Please take a few minutes to complete the written survey that you may receive in the mail after your visit with us. Thank you!             Your Updated Medication List - Protect others around you: Learn how to safely use, store and throw away your medicines at www.disposemymeds.org.          This list is accurate as of 8/6/18 10:51 AM.  Always use your most recent med list.                    Brand Name Dispense Instructions for use Diagnosis    CRESTOR 20 MG tablet   Generic drug:  rosuvastatin      Take 10 mg by mouth daily.        metoprolol succinate 25 MG 24 hr tablet    TOPROL-XL     Take 25 mg by mouth At Bedtime.        omeprazole 20 MG CR capsule    priLOSEC     Take 20 mg by mouth daily.        order for DME      CPAP every night        vitamin D 2000 units Caps      Take 1 tablet by mouth.        ZOLOFT 50 MG tablet   Generic drug:  sertraline      Take 50 mg by mouth At Bedtime.

## 2018-08-06 NOTE — PROGRESS NOTES
Medtronic Adapta (D) Remote PPM Device Check  AP: 26 % : 2.5 %  Mode: AAI<->DDD        Presenting Rhythm: AS/VS, rates 68-74bpm  Heart Rate: Adequate rates per histogram  Sensing: Stable    Pacing Threshold: Stable    Impedance: Stable  Battery Status: 7-11.5 years  Atrial Arrhythmia: None  Ventricular Arrhythmia: None     Care Plan: F/u annual threshold in 3 months, order placed. LM with results.TapanCVT

## 2018-09-05 ENCOUNTER — OFFICE VISIT (OUTPATIENT)
Dept: CARDIOLOGY | Facility: CLINIC | Age: 63
End: 2018-09-05
Payer: COMMERCIAL

## 2018-09-05 VITALS
WEIGHT: 241 LBS | OXYGEN SATURATION: 96 % | SYSTOLIC BLOOD PRESSURE: 118 MMHG | HEIGHT: 63 IN | DIASTOLIC BLOOD PRESSURE: 74 MMHG | BODY MASS INDEX: 42.7 KG/M2 | HEART RATE: 69 BPM

## 2018-09-05 DIAGNOSIS — I44.2 ATRIOVENTRICULAR BLOCK, COMPLETE (H): ICD-10-CM

## 2018-09-05 PROCEDURE — 99214 OFFICE O/P EST MOD 30 MIN: CPT | Performed by: INTERNAL MEDICINE

## 2018-09-05 RX ORDER — CYCLOSPORINE 0.5 MG/ML
1 EMULSION OPHTHALMIC 2 TIMES DAILY
COMMUNITY
End: 2023-06-08

## 2018-09-05 NOTE — PROGRESS NOTES
HPI and Plan:   See dictation    Orders Placed This Encounter   Procedures     Follow-Up with Cardiologist     Orders Placed This Encounter   Medications     cycloSPORINE (RESTASIS) 0.05 % ophthalmic emulsion     Si drop 2 times daily     There are no discontinued medications.      Encounter Diagnosis   Name Primary?     Atrioventricular block, complete (H)        CURRENT MEDICATIONS:  Current Outpatient Prescriptions   Medication Sig Dispense Refill     Cholecalciferol (VITAMIN D) 2000 UNIT CAPS Take 1 tablet by mouth.       cycloSPORINE (RESTASIS) 0.05 % ophthalmic emulsion 1 drop 2 times daily       metoprolol (TOPROL-XL) 25 MG 24 hr tablet Take 25 mg by mouth At Bedtime.       omeprazole (PRILOSEC) 20 MG capsule Take 20 mg by mouth daily.       order for DME CPAP every night       rosuvastatin (CRESTOR) 20 MG tablet Take 10 mg by mouth daily.       sertraline (ZOLOFT) 50 MG tablet Take 50 mg by mouth At Bedtime.         ALLERGIES     Allergies   Allergen Reactions     Penicillins Rash       PAST MEDICAL HISTORY:  Past Medical History:   Diagnosis Date     Angina pectoris     normal cor artery-?syndrome X, ?Prinzmetal's     Anxiety      Arrhythmia     PVC     Costochondritis      Depression      GERD (gastroesophageal reflux disease)     normal EGD     Hyperlipidaemia      Hypertension      Impaired fasting glucose      Migraines      Pacemaker     Medtronic adapta  pacemaker serial [id number]     Second degree heart block     Medtronic adapta  pacemaker serial [id number]     Shingles      Sleep apnea     CPAP       PAST SURGICAL HISTORY:  Past Surgical History:   Procedure Laterality Date     DILATION AND CURETTAGE  2012    Procedure:DILATION AND CURETTAGE; DILATION AND CURETTAGE ; Surgeon:NIKI QUEEN; Location:[facility] OR     H PERM PACER DBLE LEAD       IMPLANT PACEMAKER       LAPAROSCOPIC HYSTERECTOMY TOTAL  2012    Procedure:LAPAROSCOPIC HYSTERECTOMY TOTAL; LAPAROSCOPIC TOTAL  "HYSTERECTOMY , BILATERAL SALPINGO-OOPHERECTOMY; Surgeon:NIKI QUEEN; Location:[facility] OR     TUBAL LIGATION         FAMILY HISTORY:  Family History   Problem Relation Age of Onset     Hypertension Mother      Hyperlipidemia Mother      Hypertension Father      Diabetes Father      Hyperlipidemia Father      Myocardial Infarction Father 84     Myocardial Infarction Maternal Grandfather      Myocardial Infarction Paternal Grandmother      HEART DISEASE Daughter      minor MVP       SOCIAL HISTORY:  Social History     Social History     Marital status:      Spouse name: N/A     Number of children: N/A     Years of education: N/A     Social History Main Topics     Smoking status: Former Smoker     Quit date: 1/9/2008     Smokeless tobacco: Never Used     Alcohol use Yes      Comment: 2 drinks a month     Drug use: No     Sexual activity: Not Asked     Other Topics Concern     Caffeine Concern No     1-2 cups per day     Sleep Concern Yes     sleep apnea , cpap     Weight Concern No     Special Diet No     Exercise Yes     some walking 2-3 days a week     Seat Belt Yes     Social History Narrative       Review of Systems:  Skin:  Negative     Eyes:  Positive for    ENT:  Negative    Respiratory:  Positive for sleep apnea;CPAP  Cardiovascular:    dizziness;Positive for;palpitations  Gastroenterology: Positive for reflux  Genitourinary:  Negative    Musculoskeletal:       Neurologic:  Negative    Psychiatric:  Positive for excessive stress  Heme/Lymph/Imm:  Negative    Endocrine:  Negative      Physical Exam:  Vitals: /74 (BP Location: Right arm, Patient Position: Sitting, Cuff Size: Adult Regular)  Pulse 69  Ht 1.6 m (5' 3\")  Wt 109.3 kg (241 lb)  SpO2 96%  BMI 42.69 kg/m2    Constitutional:  cooperative, alert and oriented, well developed, well nourished, in no acute distress        Skin:  warm and dry to the touch, no apparent skin lesions or masses noted   pacemaker incision in the left " infraclavicular area was well-healed      Head:  normocephalic, no masses or lesions        Eyes:  pupils equal and round, conjunctivae and lids unremarkable, sclera white, no xanthalasma, EOMS intact, no nystagmus        Lymph:No Cervical lymphadenopathy present     ENT:  no pallor or cyanosis, dentition good        Neck:  carotid pulses are full and equal bilaterally, JVP normal, no carotid bruit        Respiratory:  normal breath sounds, clear to auscultation, normal A-P diameter, normal symmetry, normal respiratory excursion, no use of accessory muscles         Cardiac: regular rhythm, normal S1/S2, no S3 or S4, apical impulse not displaced, no murmurs, gallops or rubs                pulses full and equal, no bruits auscultated                                        GI:  abdomen soft, non-tender, BS normoactive, no mass, no HSM, no bruits obese      Extremities and Muscular Skeletal:  no deformities, clubbing, cyanosis, erythema observed;no edema              Neurological:  no gross motor deficits        Psych:  Alert and Oriented x 3      Recent Lab Results:  LIPID RESULTS:  Lab Results   Component Value Date    CHOL 232 (A) 06/19/2015    HDL 57 06/19/2015     06/19/2015    TRIG 188 06/19/2015    CHOLHDLRATIO 3.68 05/30/2014       LIVER ENZYME RESULTS:  Lab Results   Component Value Date    AST 16 04/01/2016    ALT 29 04/01/2016       CBC RESULTS:  Lab Results   Component Value Date    WBC 9.1 04/01/2016    RBC 4.48 04/01/2016    HGB 12.5 04/01/2016    HCT 38.7 04/01/2016    MCV 86 04/01/2016    MCH 27.9 04/01/2016    MCHC 32.3 04/01/2016    RDW 13.3 04/01/2016     04/01/2016       BMP RESULTS:  Lab Results   Component Value Date     04/01/2016    POTASSIUM 4.5 04/01/2016    CHLORIDE 106 04/01/2016    CO2 28 04/01/2016    ANIONGAP 5 04/01/2016    GLC 90 04/01/2016    BUN 13 04/01/2016    CR 0.76 04/01/2016    GFRESTIMATED 64 04/01/2016    GFRESTBLACK 78 04/01/2016    REGLA 9.0 04/01/2016         A1C RESULTS:  No results found for: A1C    INR RESULTS:  Lab Results   Component Value Date    INR 0.98 12/06/2011           CC  No referring provider defined for this encounter.

## 2018-09-05 NOTE — LETTER
9/5/2018      Hayley Hays MD  [employer] [address] [facility] [address]      RE: Emmy Sawyer       Dear Colleague,    I had the pleasure of seeing Emmy Sawyer in the [facility].    Service Date: 09/05/2018      HISTORY OF PRESENT ILLNESS:  I had the pleasure of following up on our mutual patient, Emmy Sawyer.  She is a delightful 63-year-old woman.  As you know, she initially presented with some atypical chest pain a number of years ago which may have been cardiac or noncardiac or vasospasm and with this, she had second-degree AV block.  She eventually had to have a pacemaker placed because of ongoing AV block.  Interestingly, since that time, her pacer shows that she is mostly pacing in the atrial channel 25% of the time and in the ventricle channel only 2%-3% of the time which is not what one would have guessed with that.  She does have a history of PVCs and occasionally does feel them.  She states they are not a big problem.  She has no dizziness, no syncope, no chest pain.      I know you are running her cholesterol numbers.  I again reviewed them with her today.  I can see from Epic, she still has impaired fasting glucose.  According to our chart, her weight is up a little bit and her triglycerides are mildly elevated.  Her LDL is still a little higher than I would like to see.  You might want to consider increasing Crestor from 10 mg to 20 or 10 to 40 to try to get her LDL closer to about 100.  Her HDL numbers are excellent.  Today, we reviewed the old MARIANO trial.  We also reviewed the Crestor Regression trial to try to show her some data about why it might be nicer to get her cholesterol numbers a little bit better.  We also today talked extensively about diet and exercise.  I gave her an exercise prescription, both for aerobic fitness but also for weight loss and calorie burning and these were discussed in depth today.  I will see her back in 1  year.  Again, thank you in advance for taking care of her cholesterol numbers and her blood pressure numbers and those blood pressure numbers are quite good.      Today's visit was 25 minutes, greater than 50% counseling.      Geovanna Alaniz MD       cc:   Hayley Hays MD    [facility]    [address] [place] [address]         GEOVANNA ALANIZ MD             D: 2018   T: 2018   MT: DW      Name:     DEBORAH SHEARER   MRN:      [id number]        Account:      [id number]   :      1955           Service Date: 2018      Document: [id number]         Outpatient Encounter Prescriptions as of 2018   Medication Sig Dispense Refill     Cholecalciferol (VITAMIN D) 2000 UNIT CAPS Take 1 tablet by mouth.       cycloSPORINE (RESTASIS) 0.05 % ophthalmic emulsion 1 drop 2 times daily       metoprolol (TOPROL-XL) 25 MG 24 hr tablet Take 25 mg by mouth At Bedtime.       omeprazole (PRILOSEC) 20 MG capsule Take 20 mg by mouth daily.       order for DME CPAP every night       rosuvastatin (CRESTOR) 20 MG tablet Take 10 mg by mouth daily.       sertraline (ZOLOFT) 50 MG tablet Take 50 mg by mouth At Bedtime.       No facility-administered encounter medications on file as of 2018.        Again, thank you for allowing me to participate in the care of your patient.      Sincerely,    Geovanna Alaniz MD     [facility]

## 2018-09-05 NOTE — PROGRESS NOTES
Service Date: 09/05/2018      HISTORY OF PRESENT ILLNESS:  I had the pleasure of following up on our mutual patient, Emmy Sawyer.  She is a delightful 63-year-old woman.  As you know, she initially presented with some atypical chest pain a number of years ago which may have been cardiac or noncardiac or vasospasm and with this, she had second-degree AV block.  She eventually had to have a pacemaker placed because of ongoing AV block.  Interestingly, since that time, her pacer shows that she is mostly pacing in the atrial channel 25% of the time and in the ventricle channel only 2%-3% of the time which is not what one would have guessed with that.  She does have a history of PVCs and occasionally does feel them.  She states they are not a big problem.  She has no dizziness, no syncope, no chest pain.      I know you are running her cholesterol numbers.  I again reviewed them with her today.  I can see from Epic, she still has impaired fasting glucose.  According to our chart, her weight is up a little bit and her triglycerides are mildly elevated.  Her LDL is still a little higher than I would like to see.  You might want to consider increasing Crestor from 10 mg to 20 or 10 to 40 to try to get her LDL closer to about 100.  Her HDL numbers are excellent.  Today, we reviewed the old MARIANO trial.  We also reviewed the Crestor Regression trial to try to show her some data about why it might be nicer to get her cholesterol numbers a little bit better.  We also today talked extensively about diet and exercise.  I gave her an exercise prescription, both for aerobic fitness but also for weight loss and calorie burning and these were discussed in depth today.  I will see her back in 1 year.  Again, thank you in advance for taking care of her cholesterol numbers and her blood pressure numbers and those blood pressure numbers are quite good.      Today's visit was 25 minutes, greater than 50% counseling.      Agustin Madden,  MD       cc:   Hayley Hays MD    [facility]    [address] [place] [address]         GEOVANNA ALANIZ MD             D: 2018   T: 2018   MT: FELIX      Name:     DEBORAH SHEARER   MRN:      [id number]        Account:      [id number]   :      1955           Service Date: 2018      Document: [id number]

## 2018-09-05 NOTE — LETTER
2018    Hayley Hays MD  [employer] [address] [facility] [address]    RE: Emmy Sawyer       Dear Colleague,    I had the pleasure of seeing Emmy Sawyer in the [facility].    HPI and Plan:   See dictation    Orders Placed This Encounter   Procedures     Follow-Up with Cardiologist     Orders Placed This Encounter   Medications     cycloSPORINE (RESTASIS) 0.05 % ophthalmic emulsion     Si drop 2 times daily     There are no discontinued medications.      Encounter Diagnosis   Name Primary?     Atrioventricular block, complete (H)        CURRENT MEDICATIONS:  Current Outpatient Prescriptions   Medication Sig Dispense Refill     Cholecalciferol (VITAMIN D) 2000 UNIT CAPS Take 1 tablet by mouth.       cycloSPORINE (RESTASIS) 0.05 % ophthalmic emulsion 1 drop 2 times daily       metoprolol (TOPROL-XL) 25 MG 24 hr tablet Take 25 mg by mouth At Bedtime.       omeprazole (PRILOSEC) 20 MG capsule Take 20 mg by mouth daily.       order for DME CPAP every night       rosuvastatin (CRESTOR) 20 MG tablet Take 10 mg by mouth daily.       sertraline (ZOLOFT) 50 MG tablet Take 50 mg by mouth At Bedtime.         ALLERGIES     Allergies   Allergen Reactions     Penicillins Rash       PAST MEDICAL HISTORY:  Past Medical History:   Diagnosis Date     Angina pectoris     normal cor artery-?syndrome X, ?Prinzmetal's     Anxiety      Arrhythmia     PVC     Costochondritis      Depression      GERD (gastroesophageal reflux disease)     normal EGD     Hyperlipidaemia      Hypertension      Impaired fasting glucose      Migraines      Pacemaker     Medtronic manny mclaughlin pacemaker serial [id number]     Second degree heart block     Medtronic adapta  pacemaker serial [id number]     Shingles      Sleep apnea     CPAP       PAST SURGICAL HISTORY:  Past Surgical History:   Procedure Laterality Date     DILATION AND CURETTAGE  2012    Procedure:DILATION AND CURETTAGE;  "DILATION AND CURETTAGE ; Surgeon:NIKI QUEEN; Location:[facility] OR     H PERM PACER DBLE LEAD       IMPLANT PACEMAKER  2003     LAPAROSCOPIC HYSTERECTOMY TOTAL  2/13/2012    Procedure:LAPAROSCOPIC HYSTERECTOMY TOTAL; LAPAROSCOPIC TOTAL HYSTERECTOMY , BILATERAL SALPINGO-OOPHERECTOMY; Surgeon:NIKI QUEEN; Location:[facility] OR     TUBAL LIGATION         FAMILY HISTORY:  Family History   Problem Relation Age of Onset     Hypertension Mother      Hyperlipidemia Mother      Hypertension Father      Diabetes Father      Hyperlipidemia Father      Myocardial Infarction Father 84     Myocardial Infarction Maternal Grandfather      Myocardial Infarction Paternal Grandmother      HEART DISEASE Daughter      minor MVP       SOCIAL HISTORY:  Social History     Social History     Marital status:      Spouse name: N/A     Number of children: N/A     Years of education: N/A     Social History Main Topics     Smoking status: Former Smoker     Quit date: 1/9/2008     Smokeless tobacco: Never Used     Alcohol use Yes      Comment: 2 drinks a month     Drug use: No     Sexual activity: Not Asked     Other Topics Concern     Caffeine Concern No     1-2 cups per day     Sleep Concern Yes     sleep apnea , cpap     Weight Concern No     Special Diet No     Exercise Yes     some walking 2-3 days a week     Seat Belt Yes     Social History Narrative       Review of Systems:  Skin:  Negative     Eyes:  Positive for    ENT:  Negative    Respiratory:  Positive for sleep apnea;CPAP  Cardiovascular:    dizziness;Positive for;palpitations  Gastroenterology: Positive for reflux  Genitourinary:  Negative    Musculoskeletal:       Neurologic:  Negative    Psychiatric:  Positive for excessive stress  Heme/Lymph/Imm:  Negative    Endocrine:  Negative      Physical Exam:  Vitals: /74 (BP Location: Right arm, Patient Position: Sitting, Cuff Size: Adult Regular)  Pulse 69  Ht 1.6 m (5' 3\")  Wt 109.3 kg (241 lb)  SpO2 96%  BMI 42.69 " kg/m2    Constitutional:  cooperative, alert and oriented, well developed, well nourished, in no acute distress        Skin:  warm and dry to the touch, no apparent skin lesions or masses noted   pacemaker incision in the left infraclavicular area was well-healed      Head:  normocephalic, no masses or lesions        Eyes:  pupils equal and round, conjunctivae and lids unremarkable, sclera white, no xanthalasma, EOMS intact, no nystagmus        Lymph:No Cervical lymphadenopathy present     ENT:  no pallor or cyanosis, dentition good        Neck:  carotid pulses are full and equal bilaterally, JVP normal, no carotid bruit        Respiratory:  normal breath sounds, clear to auscultation, normal A-P diameter, normal symmetry, normal respiratory excursion, no use of accessory muscles         Cardiac: regular rhythm, normal S1/S2, no S3 or S4, apical impulse not displaced, no murmurs, gallops or rubs                pulses full and equal, no bruits auscultated                                        GI:  abdomen soft, non-tender, BS normoactive, no mass, no HSM, no bruits obese      Extremities and Muscular Skeletal:  no deformities, clubbing, cyanosis, erythema observed;no edema              Neurological:  no gross motor deficits        Psych:  Alert and Oriented x 3      Recent Lab Results:  LIPID RESULTS:  Lab Results   Component Value Date    CHOL 232 (A) 06/19/2015    HDL 57 06/19/2015     06/19/2015    TRIG 188 06/19/2015    CHOLHDLRATIO 3.68 05/30/2014       LIVER ENZYME RESULTS:  Lab Results   Component Value Date    AST 16 04/01/2016    ALT 29 04/01/2016       CBC RESULTS:  Lab Results   Component Value Date    WBC 9.1 04/01/2016    RBC 4.48 04/01/2016    HGB 12.5 04/01/2016    HCT 38.7 04/01/2016    MCV 86 04/01/2016    MCH 27.9 04/01/2016    MCHC 32.3 04/01/2016    RDW 13.3 04/01/2016     04/01/2016       BMP RESULTS:  Lab Results   Component Value Date     04/01/2016    POTASSIUM 4.5  04/01/2016    CHLORIDE 106 04/01/2016    CO2 28 04/01/2016    ANIONGAP 5 04/01/2016    GLC 90 04/01/2016    BUN 13 04/01/2016    CR 0.76 04/01/2016    GFRESTIMATED 64 04/01/2016    GFRESTBLACK 78 04/01/2016    REGLA 9.0 04/01/2016        A1C RESULTS:  No results found for: A1C    INR RESULTS:  Lab Results   Component Value Date    INR 0.98 12/06/2011           CC  No referring provider defined for this encounter.    Thank you for allowing me to participate in the care of your patient.      Sincerely,     Agustin Madden MD     [facility]    cc:   No referring provider defined for this encounter.

## 2018-09-05 NOTE — MR AVS SNAPSHOT
After Visit Summary   9/5/2018    Emmy Sawyer    MRN: [id number]           Patient Information     Date Of Birth          1955        Visit Information        Provider Department      9/5/2018 8:00 AM Agustin Madden MD [facility]        Today's Diagnoses     Atrioventricular block, complete (H)           Follow-ups after your visit        Additional Services     Follow-Up with Cardiologist                 Your next 10 appointments already scheduled     Nov 14, 2018  8:10 AM CST   Pacemaker Check with GURVINDER CAREY   [facility] ([facility])    [address]  [facility] [place] [facility] [address] [phone]              Future tests that were ordered for you today     Open Future Orders        Priority Expected Expires Ordered    Follow-Up with Cardiologist Routine 9/5/2019 9/6/2019 9/5/2018            Who to contact     If you have questions or need follow up information about today's clinic visit or your schedule please contact [facility] directly at [phone].  Normal or non-critical lab and imaging results will be communicated to you by Truckilyhart, letter or phone within 4 business days after the clinic has received the results. If you do not hear from us within 7 days, please contact the clinic through Truckilyhart or phone. If you have a critical or abnormal lab result, we will notify you by phone as soon as possible.  Submit refill requests through [employer] or call your pharmacy and they will forward the refill request to us. Please allow 3 business days for your refill to be completed.          Additional Information About Your Visit        Truckilyhart Information     [employer] gives you secure access to your electronic health record. If you see a primary care provider, you can also send messages to your care team and make appointments. If you have  "questions, please call your primary care clinic.  If you do not have a primary care provider, please call [phone] and they will assist you.        Care EveryWhere ID     This is your Care EveryWhere ID. This could be used by other organizations to access your [place] medical records  [id number]        Your Vitals Were     Pulse Height Pulse Oximetry BMI (Body Mass Index)          69 1.6 m (5' 3\") 96% 42.69 kg/m2         Blood Pressure from Last 3 Encounters:   09/05/18 118/74   09/06/17 118/64   09/02/16 120/78    Weight from Last 3 Encounters:   09/05/18 109.3 kg (241 lb)   09/06/17 108 kg (238 lb)   09/02/16 106.1 kg (234 lb)              We Performed the Following     Follow-Up with Cardiologist        Primary Care Provider Office Phone # Fax #    Hayley Hays [phone]       [facility] [address] [address] [address]        Equal Access to Services     [facility]: Hadii aad ku hadasho Soomaali, waaxda luqadaha, qaybta kaalmada adeegyada, waxay curryin haymima bello . So [facility] [phone].    ATENCIÓN: Si habla español, tiene a king disposición servicios gratuitos de asistencia lingüística. [facility] [phone].    We comply with applicable federal civil rights laws and [place] laws. We do not discriminate on the basis of race, color, national origin, age, disability, sex, sexual orientation, or gender identity.            Thank you!     Thank you for choosing [facility]  for your care. Our goal is always to provide you with excellent care. Hearing back from our patients is one way we can continue to improve our services. Please take a few minutes to complete the written survey that you may receive in the mail after your visit with us. Thank you!             Your Updated Medication List - Protect others around you: Learn how to safely use, store and throw away your medicines at www.disposemymeds.org.        "   This list is accurate as of 9/5/18  8:40 AM.  Always use your most recent med list.                   Brand Name Dispense Instructions for use Diagnosis    CRESTOR 20 MG tablet   Generic drug:  rosuvastatin      Take 10 mg by mouth daily.        cycloSPORINE 0.05 % ophthalmic emulsion    RESTASIS     1 drop 2 times daily        metoprolol succinate 25 MG 24 hr tablet    TOPROL-XL     Take 25 mg by mouth At Bedtime.        omeprazole 20 MG CR capsule    priLOSEC     Take 20 mg by mouth daily.        order for DME      CPAP every night        vitamin D 2000 units Caps      Take 1 tablet by mouth.        ZOLOFT 50 MG tablet   Generic drug:  sertraline      Take 50 mg by mouth At Bedtime.

## 2018-11-14 ENCOUNTER — ALLIED HEALTH/NURSE VISIT (OUTPATIENT)
Dept: CARDIOLOGY | Facility: CLINIC | Age: 63
End: 2018-11-14
Payer: COMMERCIAL

## 2018-11-14 DIAGNOSIS — Z95.0 CARDIAC PACEMAKER IN SITU: ICD-10-CM

## 2018-11-14 DIAGNOSIS — I44.2 ATRIOVENTRICULAR BLOCK, COMPLETE (H): Primary | ICD-10-CM

## 2018-11-14 PROCEDURE — 93280 PM DEVICE PROGR EVAL DUAL: CPT | Performed by: INTERNAL MEDICINE

## 2018-11-14 NOTE — PROGRESS NOTES
Medtronic Adapta (D) Pacemaker Device Check  AP: 26 % : 3 %  Mode: AAI-->DDD           Underlying Rhythm: SB/SR, 50-60's, occasional PVCs  Heart Rate: excellent variability  Sensing: WNL      Pacing Threshold: WNL     Impedance: WNL  Battery Status: 9 yrs estimated longevity   Device Site: [facility]  Atrial Arrhythmia: none  Ventricular Arrhythmia: none  Setting Change: changed V lead Minimum Adapted Amplitude from 1.5X to 2.0X per clinic protocol.     Care Plan: remote in 3 months, scheduled. OV with Dr. Madden due 9/2019   EC RN

## 2018-11-14 NOTE — MR AVS SNAPSHOT
After Visit Summary   11/14/2018    Emmy Sawyer    MRN: [id number]           Patient Information     Date Of Birth          1955        Visit Information        Provider Department      11/14/2018 8:10 AM GURVINDER CAREY [facility]        Today's Diagnoses     Atrioventricular block, complete (H)    -  1    Cardiac pacemaker in situ           Follow-ups after your visit        Your next 10 appointments already scheduled     Feb 26, 2019  3:45 PM CST   Remote PPM Check with TINOCO TECH1   [facility] ([facility])    [address] [place] [address] [address]  [facility] [address] [phone] OPT 2           This appointment is for a remote check of your pacemaker.  This is not an appointment at the office.              Who to contact     If you have questions or need follow up information about today's clinic visit or your schedule please contact [facility] directly at [phone].  Normal or non-critical lab and imaging results will be communicated to you by Immediatelyhart, letter or phone within 4 business days after the clinic has received the results. If you do not hear from us within 7 days, please contact the clinic through [employer] or phone. If you have a critical or abnormal lab result, we will notify you by phone as soon as possible.  Submit refill requests through [employer] or call your pharmacy and they will forward the refill request to us. Please allow 3 business days for your refill to be completed.          Additional Information About Your Visit        MyChart Information     [employer] gives you secure access to your electronic health record. If you see a primary care provider, you can also send messages to your care team and make appointments. If you have questions, please call your primary care clinic.  If you do not have a primary care provider, please call [phone] and  they will assist you.        Care EveryWhere ID     This is your Care EveryWhere ID. This could be used by other organizations to access your [place] medical records  [id number]         Blood Pressure from Last 3 Encounters:   09/05/18 118/74   09/06/17 118/64   09/02/16 120/78    Weight from Last 3 Encounters:   09/05/18 109.3 kg (241 lb)   09/06/17 108 kg (238 lb)   09/02/16 106.1 kg (234 lb)              We Performed the Following     Follow-Up with Device Clinic     PM DEVICE PROGRAMMING EVAL, DUAL LEAD PACER (04005)        Primary Care Provider Office Phone # Fax #    Hayley Crystal Hays [phone]       [facility] [address] [facility] [address]        Equal Access to Services     CHALINO MAYA : Hadii aad alla hadasho Soomaali, waaxda luqadaha, qaybta kaalmada adeegyada, waxay harshal bello . So [facility] [phone].    ATENCIÓN: Si habla español, tiene a king disposición servicios gratuitos de asistencia lingüística. Margarita al [phone].    We comply with applicable federal civil rights laws and [place] laws. We do not discriminate on the basis of race, color, national origin, age, disability, sex, sexual orientation, or gender identity.            Thank you!     Thank you for choosing [facility]  for your care. Our goal is always to provide you with excellent care. Hearing back from our patients is one way we can continue to improve our services. Please take a few minutes to complete the written survey that you may receive in the mail after your visit with us. Thank you!             Your Updated Medication List - Protect others around you: Learn how to safely use, store and throw away your medicines at www.disposemymeds.org.          This list is accurate as of 11/14/18 10:09 AM.  Always use your most recent med list.                   Brand Name Dispense Instructions for use Diagnosis    CRESTOR 20 MG tablet    Generic drug:  rosuvastatin      Take 10 mg by mouth daily.        cycloSPORINE 0.05 % ophthalmic emulsion    RESTASIS     1 drop 2 times daily        metoprolol succinate 25 MG 24 hr tablet    TOPROL-XL     Take 25 mg by mouth At Bedtime.        omeprazole 20 MG CR capsule    priLOSEC     Take 20 mg by mouth daily.        order for DME      CPAP every night        vitamin D 2000 units Caps      Take 1 tablet by mouth.        ZOLOFT 50 MG tablet   Generic drug:  sertraline      Take 50 mg by mouth At Bedtime.

## 2019-02-26 ENCOUNTER — ANCILLARY PROCEDURE (OUTPATIENT)
Dept: CARDIOLOGY | Facility: CLINIC | Age: 64
End: 2019-02-26
Attending: INTERNAL MEDICINE
Payer: COMMERCIAL

## 2019-02-26 DIAGNOSIS — Z95.0 PACEMAKER: ICD-10-CM

## 2019-02-26 PROCEDURE — 93296 REM INTERROG EVL PM/IDS: CPT | Performed by: INTERNAL MEDICINE

## 2019-02-27 LAB
MDC_IDC_LEAD_IMPLANT_DT: NORMAL
MDC_IDC_LEAD_IMPLANT_DT: NORMAL
MDC_IDC_LEAD_LOCATION: NORMAL
MDC_IDC_LEAD_LOCATION: NORMAL
MDC_IDC_LEAD_MFG: NORMAL
MDC_IDC_LEAD_MFG: NORMAL
MDC_IDC_LEAD_MODEL: NORMAL
MDC_IDC_LEAD_MODEL: NORMAL
MDC_IDC_LEAD_POLARITY_TYPE: NORMAL
MDC_IDC_LEAD_POLARITY_TYPE: NORMAL
MDC_IDC_LEAD_SERIAL: NORMAL
MDC_IDC_LEAD_SERIAL: NORMAL
MDC_IDC_LEAD_SPECIAL_FUNCTION: NORMAL
MDC_IDC_LEAD_SPECIAL_FUNCTION: NORMAL
MDC_IDC_MSMT_BATTERY_DTM: NORMAL
MDC_IDC_MSMT_BATTERY_IMPEDANCE: 573 OHM
MDC_IDC_MSMT_BATTERY_REMAINING_LONGEVITY: 102 MO
MDC_IDC_MSMT_BATTERY_STATUS: NORMAL
MDC_IDC_MSMT_BATTERY_VOLTAGE: 2.78 V
MDC_IDC_MSMT_LEADCHNL_RA_IMPEDANCE_VALUE: 438 OHM
MDC_IDC_MSMT_LEADCHNL_RA_PACING_THRESHOLD_AMPLITUDE: 0.38 V
MDC_IDC_MSMT_LEADCHNL_RA_PACING_THRESHOLD_PULSEWIDTH: 0.4 MS
MDC_IDC_MSMT_LEADCHNL_RV_IMPEDANCE_VALUE: 802 OHM
MDC_IDC_MSMT_LEADCHNL_RV_PACING_THRESHOLD_AMPLITUDE: 0.5 V
MDC_IDC_MSMT_LEADCHNL_RV_PACING_THRESHOLD_PULSEWIDTH: 0.4 MS
MDC_IDC_PG_IMPLANT_DTM: NORMAL
MDC_IDC_PG_MFG: NORMAL
MDC_IDC_PG_MODEL: NORMAL
MDC_IDC_PG_SERIAL: NORMAL
MDC_IDC_PG_TYPE: NORMAL
MDC_IDC_SESS_CLINIC_NAME: NORMAL
MDC_IDC_SESS_DTM: NORMAL
MDC_IDC_SESS_TYPE: NORMAL
MDC_IDC_SET_BRADY_AT_MODE_SWITCH_MODE: NORMAL
MDC_IDC_SET_BRADY_AT_MODE_SWITCH_RATE: 175 {BEATS}/MIN
MDC_IDC_SET_BRADY_LOWRATE: 60 {BEATS}/MIN
MDC_IDC_SET_BRADY_MAX_SENSOR_RATE: 150 {BEATS}/MIN
MDC_IDC_SET_BRADY_MAX_TRACKING_RATE: 150 {BEATS}/MIN
MDC_IDC_SET_BRADY_MODE: NORMAL
MDC_IDC_SET_BRADY_PAV_DELAY_LOW: 150 MS
MDC_IDC_SET_BRADY_SAV_DELAY_LOW: 120 MS
MDC_IDC_SET_LEADCHNL_RA_PACING_AMPLITUDE: 1.5 V
MDC_IDC_SET_LEADCHNL_RA_PACING_ANODE_ELECTRODE_1: NORMAL
MDC_IDC_SET_LEADCHNL_RA_PACING_ANODE_LOCATION_1: NORMAL
MDC_IDC_SET_LEADCHNL_RA_PACING_CAPTURE_MODE: NORMAL
MDC_IDC_SET_LEADCHNL_RA_PACING_CATHODE_ELECTRODE_1: NORMAL
MDC_IDC_SET_LEADCHNL_RA_PACING_CATHODE_LOCATION_1: NORMAL
MDC_IDC_SET_LEADCHNL_RA_PACING_POLARITY: NORMAL
MDC_IDC_SET_LEADCHNL_RA_PACING_PULSEWIDTH: 0.4 MS
MDC_IDC_SET_LEADCHNL_RA_SENSING_ANODE_ELECTRODE_1: NORMAL
MDC_IDC_SET_LEADCHNL_RA_SENSING_ANODE_LOCATION_1: NORMAL
MDC_IDC_SET_LEADCHNL_RA_SENSING_CATHODE_ELECTRODE_1: NORMAL
MDC_IDC_SET_LEADCHNL_RA_SENSING_CATHODE_LOCATION_1: NORMAL
MDC_IDC_SET_LEADCHNL_RA_SENSING_POLARITY: NORMAL
MDC_IDC_SET_LEADCHNL_RA_SENSING_SENSITIVITY: 0.5 MV
MDC_IDC_SET_LEADCHNL_RV_PACING_AMPLITUDE: 2 V
MDC_IDC_SET_LEADCHNL_RV_PACING_ANODE_ELECTRODE_1: NORMAL
MDC_IDC_SET_LEADCHNL_RV_PACING_ANODE_LOCATION_1: NORMAL
MDC_IDC_SET_LEADCHNL_RV_PACING_CAPTURE_MODE: NORMAL
MDC_IDC_SET_LEADCHNL_RV_PACING_CATHODE_ELECTRODE_1: NORMAL
MDC_IDC_SET_LEADCHNL_RV_PACING_CATHODE_LOCATION_1: NORMAL
MDC_IDC_SET_LEADCHNL_RV_PACING_POLARITY: NORMAL
MDC_IDC_SET_LEADCHNL_RV_PACING_PULSEWIDTH: 0.4 MS
MDC_IDC_SET_LEADCHNL_RV_SENSING_ANODE_ELECTRODE_1: NORMAL
MDC_IDC_SET_LEADCHNL_RV_SENSING_ANODE_LOCATION_1: NORMAL
MDC_IDC_SET_LEADCHNL_RV_SENSING_CATHODE_ELECTRODE_1: NORMAL
MDC_IDC_SET_LEADCHNL_RV_SENSING_CATHODE_LOCATION_1: NORMAL
MDC_IDC_SET_LEADCHNL_RV_SENSING_POLARITY: NORMAL
MDC_IDC_SET_LEADCHNL_RV_SENSING_SENSITIVITY: 2.8 MV
MDC_IDC_SET_ZONE_DETECTION_INTERVAL: 333.33 MS
MDC_IDC_SET_ZONE_DETECTION_INTERVAL: 342.86 MS
MDC_IDC_SET_ZONE_TYPE: NORMAL
MDC_IDC_SET_ZONE_TYPE: NORMAL
MDC_IDC_STAT_AT_BURDEN_PERCENT: 0 %
MDC_IDC_STAT_AT_DTM_END: NORMAL
MDC_IDC_STAT_AT_DTM_START: NORMAL
MDC_IDC_STAT_AT_MODE_SW_COUNT: 0
MDC_IDC_STAT_BRADY_AP_VP_PERCENT: 0 %
MDC_IDC_STAT_BRADY_AP_VS_PERCENT: 25 %
MDC_IDC_STAT_BRADY_AS_VP_PERCENT: 1 %
MDC_IDC_STAT_BRADY_AS_VS_PERCENT: 74 %
MDC_IDC_STAT_BRADY_DTM_END: NORMAL
MDC_IDC_STAT_BRADY_DTM_START: NORMAL
MDC_IDC_STAT_EPISODE_RECENT_COUNT: 0
MDC_IDC_STAT_EPISODE_RECENT_COUNT: 0
MDC_IDC_STAT_EPISODE_RECENT_COUNT_DTM_END: NORMAL
MDC_IDC_STAT_EPISODE_RECENT_COUNT_DTM_END: NORMAL
MDC_IDC_STAT_EPISODE_RECENT_COUNT_DTM_START: NORMAL
MDC_IDC_STAT_EPISODE_RECENT_COUNT_DTM_START: NORMAL
MDC_IDC_STAT_EPISODE_TYPE: NORMAL
MDC_IDC_STAT_EPISODE_TYPE: NORMAL

## 2019-06-04 ENCOUNTER — ANCILLARY PROCEDURE (OUTPATIENT)
Dept: CARDIOLOGY | Facility: CLINIC | Age: 64
End: 2019-06-04
Attending: INTERNAL MEDICINE
Payer: COMMERCIAL

## 2019-06-04 DIAGNOSIS — Z95.0 PACEMAKER: ICD-10-CM

## 2019-06-04 PROCEDURE — 93296 REM INTERROG EVL PM/IDS: CPT | Performed by: INTERNAL MEDICINE

## 2019-06-04 PROCEDURE — 93294 REM INTERROG EVL PM/LDLS PM: CPT | Performed by: INTERNAL MEDICINE

## 2019-06-09 LAB
MDC_IDC_LEAD_IMPLANT_DT: NORMAL
MDC_IDC_LEAD_IMPLANT_DT: NORMAL
MDC_IDC_LEAD_LOCATION: NORMAL
MDC_IDC_LEAD_LOCATION: NORMAL
MDC_IDC_LEAD_MFG: NORMAL
MDC_IDC_LEAD_MFG: NORMAL
MDC_IDC_LEAD_MODEL: NORMAL
MDC_IDC_LEAD_MODEL: NORMAL
MDC_IDC_LEAD_POLARITY_TYPE: NORMAL
MDC_IDC_LEAD_POLARITY_TYPE: NORMAL
MDC_IDC_LEAD_SERIAL: NORMAL
MDC_IDC_LEAD_SERIAL: NORMAL
MDC_IDC_LEAD_SPECIAL_FUNCTION: NORMAL
MDC_IDC_LEAD_SPECIAL_FUNCTION: NORMAL
MDC_IDC_MSMT_BATTERY_DTM: NORMAL
MDC_IDC_MSMT_BATTERY_IMPEDANCE: 625 OHM
MDC_IDC_MSMT_BATTERY_REMAINING_LONGEVITY: 98 MO
MDC_IDC_MSMT_BATTERY_STATUS: NORMAL
MDC_IDC_MSMT_BATTERY_VOLTAGE: 2.78 V
MDC_IDC_MSMT_LEADCHNL_RA_IMPEDANCE_VALUE: 432 OHM
MDC_IDC_MSMT_LEADCHNL_RA_PACING_THRESHOLD_AMPLITUDE: 0.38 V
MDC_IDC_MSMT_LEADCHNL_RA_PACING_THRESHOLD_PULSEWIDTH: 0.4 MS
MDC_IDC_MSMT_LEADCHNL_RV_IMPEDANCE_VALUE: 799 OHM
MDC_IDC_MSMT_LEADCHNL_RV_PACING_THRESHOLD_AMPLITUDE: 0.75 V
MDC_IDC_MSMT_LEADCHNL_RV_PACING_THRESHOLD_PULSEWIDTH: 0.4 MS
MDC_IDC_PG_IMPLANT_DTM: NORMAL
MDC_IDC_PG_MFG: NORMAL
MDC_IDC_PG_MODEL: NORMAL
MDC_IDC_PG_SERIAL: NORMAL
MDC_IDC_PG_TYPE: NORMAL
MDC_IDC_SESS_CLINIC_NAME: NORMAL
MDC_IDC_SESS_DTM: NORMAL
MDC_IDC_SESS_TYPE: NORMAL
MDC_IDC_SET_BRADY_AT_MODE_SWITCH_MODE: NORMAL
MDC_IDC_SET_BRADY_AT_MODE_SWITCH_RATE: 175 {BEATS}/MIN
MDC_IDC_SET_BRADY_LOWRATE: 60 {BEATS}/MIN
MDC_IDC_SET_BRADY_MAX_SENSOR_RATE: 150 {BEATS}/MIN
MDC_IDC_SET_BRADY_MAX_TRACKING_RATE: 150 {BEATS}/MIN
MDC_IDC_SET_BRADY_MODE: NORMAL
MDC_IDC_SET_BRADY_PAV_DELAY_LOW: 150 MS
MDC_IDC_SET_BRADY_SAV_DELAY_LOW: 120 MS
MDC_IDC_SET_LEADCHNL_RA_PACING_AMPLITUDE: 1.5 V
MDC_IDC_SET_LEADCHNL_RA_PACING_ANODE_ELECTRODE_1: NORMAL
MDC_IDC_SET_LEADCHNL_RA_PACING_ANODE_LOCATION_1: NORMAL
MDC_IDC_SET_LEADCHNL_RA_PACING_CAPTURE_MODE: NORMAL
MDC_IDC_SET_LEADCHNL_RA_PACING_CATHODE_ELECTRODE_1: NORMAL
MDC_IDC_SET_LEADCHNL_RA_PACING_CATHODE_LOCATION_1: NORMAL
MDC_IDC_SET_LEADCHNL_RA_PACING_POLARITY: NORMAL
MDC_IDC_SET_LEADCHNL_RA_PACING_PULSEWIDTH: 0.4 MS
MDC_IDC_SET_LEADCHNL_RA_SENSING_ANODE_ELECTRODE_1: NORMAL
MDC_IDC_SET_LEADCHNL_RA_SENSING_ANODE_LOCATION_1: NORMAL
MDC_IDC_SET_LEADCHNL_RA_SENSING_CATHODE_ELECTRODE_1: NORMAL
MDC_IDC_SET_LEADCHNL_RA_SENSING_CATHODE_LOCATION_1: NORMAL
MDC_IDC_SET_LEADCHNL_RA_SENSING_POLARITY: NORMAL
MDC_IDC_SET_LEADCHNL_RA_SENSING_SENSITIVITY: 0.5 MV
MDC_IDC_SET_LEADCHNL_RV_PACING_AMPLITUDE: 2 V
MDC_IDC_SET_LEADCHNL_RV_PACING_ANODE_ELECTRODE_1: NORMAL
MDC_IDC_SET_LEADCHNL_RV_PACING_ANODE_LOCATION_1: NORMAL
MDC_IDC_SET_LEADCHNL_RV_PACING_CAPTURE_MODE: NORMAL
MDC_IDC_SET_LEADCHNL_RV_PACING_CATHODE_ELECTRODE_1: NORMAL
MDC_IDC_SET_LEADCHNL_RV_PACING_CATHODE_LOCATION_1: NORMAL
MDC_IDC_SET_LEADCHNL_RV_PACING_POLARITY: NORMAL
MDC_IDC_SET_LEADCHNL_RV_PACING_PULSEWIDTH: 0.4 MS
MDC_IDC_SET_LEADCHNL_RV_SENSING_ANODE_ELECTRODE_1: NORMAL
MDC_IDC_SET_LEADCHNL_RV_SENSING_ANODE_LOCATION_1: NORMAL
MDC_IDC_SET_LEADCHNL_RV_SENSING_CATHODE_ELECTRODE_1: NORMAL
MDC_IDC_SET_LEADCHNL_RV_SENSING_CATHODE_LOCATION_1: NORMAL
MDC_IDC_SET_LEADCHNL_RV_SENSING_POLARITY: NORMAL
MDC_IDC_SET_LEADCHNL_RV_SENSING_SENSITIVITY: 2.8 MV
MDC_IDC_SET_ZONE_DETECTION_INTERVAL: 333.33 MS
MDC_IDC_SET_ZONE_DETECTION_INTERVAL: 342.86 MS
MDC_IDC_SET_ZONE_TYPE: NORMAL
MDC_IDC_SET_ZONE_TYPE: NORMAL
MDC_IDC_STAT_AT_BURDEN_PERCENT: 0 %
MDC_IDC_STAT_AT_DTM_END: NORMAL
MDC_IDC_STAT_AT_DTM_START: NORMAL
MDC_IDC_STAT_AT_MODE_SW_COUNT: 0
MDC_IDC_STAT_BRADY_AP_VP_PERCENT: 0 %
MDC_IDC_STAT_BRADY_AP_VS_PERCENT: 24 %
MDC_IDC_STAT_BRADY_AS_VP_PERCENT: 1 %
MDC_IDC_STAT_BRADY_AS_VS_PERCENT: 76 %
MDC_IDC_STAT_BRADY_DTM_END: NORMAL
MDC_IDC_STAT_BRADY_DTM_START: NORMAL
MDC_IDC_STAT_EPISODE_RECENT_COUNT: 0
MDC_IDC_STAT_EPISODE_RECENT_COUNT: 0
MDC_IDC_STAT_EPISODE_RECENT_COUNT_DTM_END: NORMAL
MDC_IDC_STAT_EPISODE_RECENT_COUNT_DTM_END: NORMAL
MDC_IDC_STAT_EPISODE_RECENT_COUNT_DTM_START: NORMAL
MDC_IDC_STAT_EPISODE_RECENT_COUNT_DTM_START: NORMAL
MDC_IDC_STAT_EPISODE_TYPE: NORMAL
MDC_IDC_STAT_EPISODE_TYPE: NORMAL

## 2019-06-28 LAB
ANION GAP SERPL CALCULATED.3IONS-SCNC: 9 MMOL/L
BUN SERPL-MCNC: 17 MG/DL
CALCIUM SERPL-MCNC: 9.6 MG/DL
CHLORIDE SERPLBLD-SCNC: 107 MMOL/L
CHOLEST SERPL-MCNC: 213 MG/DL
CO2 SERPL-SCNC: 24 MMOL/L
CREAT SERPL-MCNC: 0.84 MG/DL
GFR SERPL CREATININE-BSD FRML MDRD: >60 ML/MIN/1.73M2
GLUCOSE SERPL-MCNC: 116 MG/DL (ref 65–100)
HDLC SERPL-MCNC: 59 MG/DL
LDLC SERPL CALC-MCNC: 115 MG/DL
NONHDLC SERPL-MCNC: NORMAL MG/DL
POTASSIUM SERPL-SCNC: 4.6 MMOL/L
SODIUM SERPL-SCNC: 140 MMOL/L
TRIGL SERPL-MCNC: 195 MG/DL

## 2019-07-22 ENCOUNTER — HOSPITAL ENCOUNTER (OUTPATIENT)
Dept: MAMMOGRAPHY | Facility: CLINIC | Age: 64
Discharge: HOME | End: 2019-07-22
Attending: FAMILY MEDICINE | Admitting: FAMILY MEDICINE
Payer: COMMERCIAL

## 2019-07-22 DIAGNOSIS — Z12.31 VISIT FOR SCREENING MAMMOGRAM: ICD-10-CM

## 2019-07-22 PROCEDURE — 77063 BREAST TOMOSYNTHESIS BI: CPT

## 2019-09-10 ENCOUNTER — ANCILLARY PROCEDURE (OUTPATIENT)
Dept: CARDIOLOGY | Facility: CLINIC | Age: 64
End: 2019-09-10
Attending: INTERNAL MEDICINE
Payer: COMMERCIAL

## 2019-09-10 DIAGNOSIS — Z95.0 PACEMAKER: ICD-10-CM

## 2019-09-10 PROCEDURE — 93296 REM INTERROG EVL PM/IDS: CPT | Performed by: INTERNAL MEDICINE

## 2019-09-10 PROCEDURE — 93294 REM INTERROG EVL PM/LDLS PM: CPT | Performed by: INTERNAL MEDICINE

## 2019-09-18 ENCOUNTER — PRE VISIT (OUTPATIENT)
Dept: CARDIOLOGY | Facility: CLINIC | Age: 64
End: 2019-09-18

## 2019-09-26 ENCOUNTER — OFFICE VISIT (OUTPATIENT)
Dept: CARDIOLOGY | Facility: CLINIC | Age: 64
End: 2019-09-26
Payer: COMMERCIAL

## 2019-09-26 VITALS
SYSTOLIC BLOOD PRESSURE: 112 MMHG | HEIGHT: 63 IN | HEART RATE: 60 BPM | WEIGHT: 244 LBS | BODY MASS INDEX: 43.23 KG/M2 | DIASTOLIC BLOOD PRESSURE: 76 MMHG

## 2019-09-26 DIAGNOSIS — I45.9 HEART BLOCK: Primary | ICD-10-CM

## 2019-09-26 PROCEDURE — 99213 OFFICE O/P EST LOW 20 MIN: CPT | Performed by: INTERNAL MEDICINE

## 2019-09-26 ASSESSMENT — MIFFLIN-ST. JEOR: SCORE: 1635.91

## 2019-09-26 NOTE — LETTER
9/26/2019      Hayley Hays MD  [facility] [address]  [facility] [place] [address]      RE: Emmy Sawyer       Dear Colleague,    I had the pleasure of seeing Emmy Sawyer in the [facility].    Service Date: 09/26/2019      HISTORY OF PRESENT ILLNESS:  I had the pleasure of following up on our mutual patient, Emmy Sawyer.  As you know, she is a very delightful 64-year-old woman.  We met her several years ago when she presented with atypical chest pain.  It was never clear if it was cardiac or vasospasm.  In any case, she developed second-degree AV block and eventually had to have a pacemaker placed because of ongoing AV block.  Interestingly, with the pacer, she paces in the atrium about 24% of the time and in the ventricle less than 1%, which is reassuring, so her AV block is much less of an issue.        She has a history of PVCs.  She is on low-dose beta-blocker for that.  She states now she does not even feel them anymore.  We did review her pacer chart numbers and they are all excellent.        As a separate issue, we do not know that she is a bit overweight.  She has impaired fasting glucose.  She has elevated triglyceride.  She has elevated LDL even on Crestor 10 mg and her hemoglobin A1c, I can see from your office is slowly rising; it is now up to 6.5.  From the patient, I understand your are right on top of this and you are sending her for diabetic education.  I am wondering if in 6 months, if she has not reached goal, one might want to consider increasing Crestor from 10 mg up to 20% to see if we can get the LDL firmly below 100.  One could make a case that Lipitor might be a little more glucose friendly than Crestor and perhaps a moderate dose Lipitor might make sense.  I will certainly leave that all to you.        From a cardiac standpoint, she reports no anginal symptoms, no dizziness, no palpitation.  Her pacer is working well.  The last  time we looked at her heart was in 2016 with an echocardiogram that showed normal ventricular function and normal valve function.  I do not think we need another echo for a number of years.  At this point, I do not think we need a stress test without any symptoms.        I will see her back in 1 year for a routine office visit.      Thank you for allowing me to see Ms. Sawyer.  Today's visit was 20 minutes, greater than 50% counseling.      cc:      [place] [address]         AGUSTIN ALANIZ MD             D: 2019   T: 2019   MT: TUNG      Name:     DEBORAH SAWYER   MRN:      [id number]        Account:      [id number]   :      1955           Service Date: 2019      Document: [id number]         Outpatient Encounter Medications as of 2019   Medication Sig Dispense Refill     Cholecalciferol (VITAMIN D) 2000 UNIT CAPS Take 1 tablet by mouth.       metoprolol (TOPROL-XL) 25 MG 24 hr tablet Take 25 mg by mouth At Bedtime.       omeprazole (PRILOSEC) 20 MG capsule Take 20 mg by mouth daily.       order for DME CPAP every night       rosuvastatin (CRESTOR) 20 MG tablet Take 10 mg by mouth daily.       sertraline (ZOLOFT) 50 MG tablet Take 50 mg by mouth At Bedtime.       cycloSPORINE (RESTASIS) 0.05 % ophthalmic emulsion 1 drop 2 times daily       No facility-administered encounter medications on file as of 2019.                    Again, thank you for allowing me to participate in the care of your patient.      Sincerely,    Agustin Alaniz MD     [facility]

## 2019-09-26 NOTE — PROGRESS NOTES
Service Date: 09/26/2019      HISTORY OF PRESENT ILLNESS:  I had the pleasure of following up on our mutual patient, Emmy Sawyer.  As you know, she is a very delightful 64-year-old woman.  We met her several years ago when she presented with atypical chest pain.  It was never clear if it was cardiac or vasospasm.  In any case, she developed second-degree AV block and eventually had to have a pacemaker placed because of ongoing AV block.  Interestingly, with the pacer, she paces in the atrium about 24% of the time and in the ventricle less than 1%, which is reassuring, so her AV block is much less of an issue.        She has a history of PVCs.  She is on low-dose beta-blocker for that.  She states now she does not even feel them anymore.  We did review her pacer chart numbers and they are all excellent.        As a separate issue, we do not know that she is a bit overweight.  She has impaired fasting glucose.  She has elevated triglyceride.  She has elevated LDL even on Crestor 10 mg and her hemoglobin A1c, I can see from your office is slowly rising; it is now up to 6.5.  From the patient, I understand your are right on top of this and you are sending her for diabetic education.  I am wondering if in 6 months, if she has not reached goal, one might want to consider increasing Crestor from 10 mg up to 20% to see if we can get the LDL firmly below 100.  One could make a case that Lipitor might be a little more glucose friendly than Crestor and perhaps a moderate dose Lipitor might make sense.  I will certainly leave that all to you.        From a cardiac standpoint, she reports no anginal symptoms, no dizziness, no palpitation.  Her pacer is working well.  The last time we looked at her heart was in 2016 with an echocardiogram that showed normal ventricular function and normal valve function.  I do not think we need another echo for a number of years.  At this point, I do not think we need a stress test without  any symptoms.        I will see her back in 1 year for a routine office visit.      Thank you for allowing me to see Ms. Shearer.  Today's visit was 20 minutes, greater than 50% counseling.      cc:      Hayley Hays MD    [address] [address]         GEOVANNA ALANIZ MD             D: 2019   T: 2019   MT: TUNG      Name:     DEBORAH SHEARER   MRN:      [id number]        Account:      [id number]   :      1955           Service Date: 2019      Document: [id number]

## 2019-09-26 NOTE — PROGRESS NOTES
HPI and Plan:   See dictation    Orders Placed This Encounter   Procedures     Follow-Up with Cardiologist     No orders of the defined types were placed in this encounter.    There are no discontinued medications.      Encounter Diagnosis   Name Primary?     Heart block Yes       CURRENT MEDICATIONS:  Current Outpatient Medications   Medication Sig Dispense Refill     Cholecalciferol (VITAMIN D) 2000 UNIT CAPS Take 1 tablet by mouth.       metoprolol (TOPROL-XL) 25 MG 24 hr tablet Take 25 mg by mouth At Bedtime.       omeprazole (PRILOSEC) 20 MG capsule Take 20 mg by mouth daily.       order for DME CPAP every night       rosuvastatin (CRESTOR) 20 MG tablet Take 10 mg by mouth daily.       sertraline (ZOLOFT) 50 MG tablet Take 50 mg by mouth At Bedtime.       cycloSPORINE (RESTASIS) 0.05 % ophthalmic emulsion 1 drop 2 times daily         ALLERGIES     Allergies   Allergen Reactions     Penicillins Rash       PAST MEDICAL HISTORY:  Past Medical History:   Diagnosis Date     Angina pectoris     normal cor artery-?syndrome X, ?Prinzmetal's     Anxiety      Arrhythmia     PVC     Costochondritis      Depression      GERD (gastroesophageal reflux disease)     normal EGD     Hyperlipidaemia      Hypertension      Impaired fasting glucose      Migraines      Pacemaker     Medtronic adapta  pacemaker serial [id number]     Second degree heart block 2003    Medtronic adapta  pacemaker serial [id number]     Shingles      Sleep apnea     CPAP       PAST SURGICAL HISTORY:  Past Surgical History:   Procedure Laterality Date     DILATION AND CURETTAGE  1/13/2012    Procedure:DILATION AND CURETTAGE; DILATION AND CURETTAGE ; Surgeon:NIKI QUEEN; Location:[facility] OR     H PERM PACER DBLE LEAD       IMPLANT PACEMAKER  2003     LAPAROSCOPIC HYSTERECTOMY TOTAL  2/13/2012    Procedure:LAPAROSCOPIC HYSTERECTOMY TOTAL; LAPAROSCOPIC TOTAL HYSTERECTOMY , BILATERAL SALPINGO-OOPHERECTOMY; Surgeon:NIKI QUEEN; Location:[facility] OR     TUBAL  LIGATION         FAMILY HISTORY:  Family History   Problem Relation Age of Onset     Hypertension Mother      Hyperlipidemia Mother      Hypertension Father      Diabetes Father      Hyperlipidemia Father      Myocardial Infarction Father 84     Myocardial Infarction Maternal Grandfather      Myocardial Infarction Paternal Grandmother      Heart Disease Daughter         minor MVP       SOCIAL HISTORY:  Social History     Socioeconomic History     Marital status:      Spouse name: None     Number of children: None     Years of education: None     Highest education level: None   Occupational History     None   Social Needs     Financial resource strain: None     Food insecurity:     Worry: None     Inability: None     Transportation needs:     Medical: None     Non-medical: None   Tobacco Use     Smoking status: Former Smoker     Last attempt to quit: 2008     Years since quittin.7     Smokeless tobacco: Never Used   Substance and Sexual Activity     Alcohol use: Yes     Comment: 2 drinks a month     Drug use: No     Sexual activity: None   Lifestyle     Physical activity:     Days per week: None     Minutes per session: None     Stress: None   Relationships     Social connections:     Talks on phone: None     Gets together: None     Attends Holiness service: None     Active member of club or organization: None     Attends meetings of clubs or organizations: None     Relationship status: None     Intimate partner violence:     Fear of current or ex partner: None     Emotionally abused: None     Physically abused: None     Forced sexual activity: None   Other Topics Concern     Parent/sibling w/ CABG, MI or angioplasty before 65F 55M? Not Asked      Service Not Asked     Blood Transfusions Not Asked     Caffeine Concern No     Comment: 1-2 cups per day     Occupational Exposure Not Asked     Hobby Hazards Not Asked     Sleep Concern Yes     Comment: sleep apnea , cpap     Stress Concern Not  "Asked     Weight Concern No     Special Diet No     Back Care Not Asked     Exercise Yes     Comment: some walking 2-3 days a week     Bike Helmet Not Asked     Seat Belt Yes     Self-Exams Not Asked   Social History Narrative     None       Review of Systems:  Skin:  Negative for     Eyes:  Negative for    ENT:  Negative for    Respiratory:  Positive for cough;sleep apnea  Cardiovascular:    fatigue  Gastroenterology: Positive for    Genitourinary:  Negative for    Musculoskeletal:  Negative for    Neurologic:  Positive for numbness or tingling of hands  Psychiatric:  Positive for    Heme/Lymph/Imm:  Negative for    Endocrine:  Positive for      Physical Exam:  Vitals: /76   Pulse 60   Ht 1.6 m (5' 3\")   Wt 110.7 kg (244 lb)   BMI 43.22 kg/m      Constitutional:  cooperative, alert and oriented, well developed, well nourished, in no acute distress        Skin:  warm and dry to the touch, no apparent skin lesions or masses noted   pacemaker incision in the left infraclavicular area was well-healed      Head:  normocephalic, no masses or lesions        Eyes:  pupils equal and round, conjunctivae and lids unremarkable, sclera white, no xanthalasma, EOMS intact, no nystagmus        Lymph:No Cervical lymphadenopathy present;No thyromegaly     ENT:  no pallor or cyanosis, dentition good        Neck:  carotid pulses are full and equal bilaterally, JVP normal, no carotid bruit        Respiratory:  normal breath sounds, clear to auscultation, normal A-P diameter, normal symmetry, normal respiratory excursion, no use of accessory muscles         Cardiac: regular rhythm, normal S1/S2, no S3 or S4, apical impulse not displaced, no murmurs, gallops or rubs             possible split S1  pulses full and equal, no bruits auscultated                                   difficult to feel fem pulses due to body habitus, no bruits    GI:  abdomen soft, non-tender, BS normoactive, no mass, no HSM, no bruits obese  "     Extremities and Muscular Skeletal:  no deformities, clubbing, cyanosis, erythema observed;no edema              Neurological:  no gross motor deficits        Psych:  Alert and Oriented x 3      Recent Lab Results:  LIPID RESULTS:  Lab Results   Component Value Date    CHOL 213 06/28/2019    HDL 59 06/28/2019     06/28/2019    TRIG 195 06/28/2019    CHOLHDLRATIO 3.68 05/30/2014       LIVER ENZYME RESULTS:  Lab Results   Component Value Date    AST 16 04/01/2016    ALT 29 04/01/2016       CBC RESULTS:  Lab Results   Component Value Date    WBC 9.1 04/01/2016    RBC 4.48 04/01/2016    HGB 12.5 04/01/2016    HCT 38.7 04/01/2016    MCV 86 04/01/2016    MCH 27.9 04/01/2016    MCHC 32.3 04/01/2016    RDW 13.3 04/01/2016     04/01/2016       BMP RESULTS:  Lab Results   Component Value Date     06/28/2019    POTASSIUM 4.6 06/28/2019    CHLORIDE 107 06/28/2019    CO2 24 06/28/2019    ANIONGAP 9 06/28/2019     (A) 06/28/2019    BUN 17 06/28/2019    CR 0.84 06/28/2019    GFRESTIMATED >60 06/28/2019    GFRESTBLACK >60 06/28/2019    REGLA 9.6 06/28/2019        A1C RESULTS:  No results found for: A1C    INR RESULTS:  Lab Results   Component Value Date    INR 0.98 12/06/2011           CC  Hayley Hays MD  [facility]  [address] [place] [address]

## 2019-09-26 NOTE — LETTER
9/26/2019    Hayley Hays MD  [facility] [address] [address] [facility] [address]    RE: Emmy Sawyer       Dear Colleague,    I had the pleasure of seeing Emmy Sawyer in the [facility].    HPI and Plan:   See dictation    Orders Placed This Encounter   Procedures     Follow-Up with Cardiologist     No orders of the defined types were placed in this encounter.    There are no discontinued medications.      Encounter Diagnosis   Name Primary?     Heart block Yes       CURRENT MEDICATIONS:  Current Outpatient Medications   Medication Sig Dispense Refill     Cholecalciferol (VITAMIN D) 2000 UNIT CAPS Take 1 tablet by mouth.       metoprolol (TOPROL-XL) 25 MG 24 hr tablet Take 25 mg by mouth At Bedtime.       omeprazole (PRILOSEC) 20 MG capsule Take 20 mg by mouth daily.       order for DME CPAP every night       rosuvastatin (CRESTOR) 20 MG tablet Take 10 mg by mouth daily.       sertraline (ZOLOFT) 50 MG tablet Take 50 mg by mouth At Bedtime.       cycloSPORINE (RESTASIS) 0.05 % ophthalmic emulsion 1 drop 2 times daily         ALLERGIES     Allergies   Allergen Reactions     Penicillins Rash       PAST MEDICAL HISTORY:  Past Medical History:   Diagnosis Date     Angina pectoris     normal cor artery-?syndrome X, ?Prinzmetal's     Anxiety      Arrhythmia     PVC     Costochondritis      Depression      GERD (gastroesophageal reflux disease)     normal EGD     Hyperlipidaemia      Hypertension      Impaired fasting glucose      Migraines      Pacemaker     Medtronic manny mclaughlin pacemaker serial [id number]     Second degree heart block 2003    Medtronic ebonia  pacemaker serial [id number]     Shingles      Sleep apnea     CPAP       PAST SURGICAL HISTORY:  Past Surgical History:   Procedure Laterality Date     DILATION AND CURETTAGE  1/13/2012    Procedure:DILATION AND CURETTAGE; DILATION AND CURETTAGE ; Surgeon:NIKI QUEEN; Location: OR      PERM PACER  DBLE LEAD       IMPLANT PACEMAKER       LAPAROSCOPIC HYSTERECTOMY TOTAL  2012    Procedure:LAPAROSCOPIC HYSTERECTOMY TOTAL; LAPAROSCOPIC TOTAL HYSTERECTOMY , BILATERAL SALPINGO-OOPHERECTOMY; Surgeon:NIKI QUEEN; Location:[facility]     TUBAL LIGATION         FAMILY HISTORY:  Family History   Problem Relation Age of Onset     Hypertension Mother      Hyperlipidemia Mother      Hypertension Father      Diabetes Father      Hyperlipidemia Father      Myocardial Infarction Father 84     Myocardial Infarction Maternal Grandfather      Myocardial Infarction Paternal Grandmother      Heart Disease Daughter         minor MVP       SOCIAL HISTORY:  Social History     Socioeconomic History     Marital status:      Spouse name: None     Number of children: None     Years of education: None     Highest education level: None   Occupational History     None   Social Needs     Financial resource strain: None     Food insecurity:     Worry: None     Inability: None     Transportation needs:     Medical: None     Non-medical: None   Tobacco Use     Smoking status: Former Smoker     Last attempt to quit: 2008     Years since quittin.7     Smokeless tobacco: Never Used   Substance and Sexual Activity     Alcohol use: Yes     Comment: 2 drinks a month     Drug use: No     Sexual activity: None   Lifestyle     Physical activity:     Days per week: None     Minutes per session: None     Stress: None   Relationships     Social connections:     Talks on phone: None     Gets together: None     Attends Anabaptist service: None     Active member of club or organization: None     Attends meetings of clubs or organizations: None     Relationship status: None     Intimate partner violence:     Fear of current or ex partner: None     Emotionally abused: None     Physically abused: None     Forced sexual activity: None   Other Topics Concern     Parent/sibling w/ CABG, MI or angioplasty before 65F 55M? Not Asked      " Service Not Asked     Blood Transfusions Not Asked     Caffeine Concern No     Comment: 1-2 cups per day     Occupational Exposure Not Asked     Hobby Hazards Not Asked     Sleep Concern Yes     Comment: sleep apnea , cpap     Stress Concern Not Asked     Weight Concern No     Special Diet No     Back Care Not Asked     Exercise Yes     Comment: some walking 2-3 days a week     Bike Helmet Not Asked     Seat Belt Yes     Self-Exams Not Asked   Social History Narrative     None       Review of Systems:  Skin:  Negative for     Eyes:  Negative for    ENT:  Negative for    Respiratory:  Positive for cough;sleep apnea  Cardiovascular:    fatigue  Gastroenterology: Positive for    Genitourinary:  Negative for    Musculoskeletal:  Negative for    Neurologic:  Positive for numbness or tingling of hands  Psychiatric:  Positive for    Heme/Lymph/Imm:  Negative for    Endocrine:  Positive for      Physical Exam:  Vitals: /76   Pulse 60   Ht 1.6 m (5' 3\")   Wt 110.7 kg (244 lb)   BMI 43.22 kg/m       Constitutional:  cooperative, alert and oriented, well developed, well nourished, in no acute distress        Skin:  warm and dry to the touch, no apparent skin lesions or masses noted   pacemaker incision in the left infraclavicular area was well-healed      Head:  normocephalic, no masses or lesions        Eyes:  pupils equal and round, conjunctivae and lids unremarkable, sclera white, no xanthalasma, EOMS intact, no nystagmus        Lymph:No Cervical lymphadenopathy present;No thyromegaly     ENT:  no pallor or cyanosis, dentition good        Neck:  carotid pulses are full and equal bilaterally, JVP normal, no carotid bruit        Respiratory:  normal breath sounds, clear to auscultation, normal A-P diameter, normal symmetry, normal respiratory excursion, no use of accessory muscles         Cardiac: regular rhythm, normal S1/S2, no S3 or S4, apical impulse not displaced, no murmurs, gallops or rubs            "  possible split S1  pulses full and equal, no bruits auscultated                                   difficult to feel fem pulses due to body habitus, no bruits    GI:  abdomen soft, non-tender, BS normoactive, no mass, no HSM, no bruits obese      Extremities and Muscular Skeletal:  no deformities, clubbing, cyanosis, erythema observed;no edema              Neurological:  no gross motor deficits        Psych:  Alert and Oriented x 3      Recent Lab Results:  LIPID RESULTS:  Lab Results   Component Value Date    CHOL 213 06/28/2019    HDL 59 06/28/2019     06/28/2019    TRIG 195 06/28/2019    CHOLHDLRATIO 3.68 05/30/2014       LIVER ENZYME RESULTS:  Lab Results   Component Value Date    AST 16 04/01/2016    ALT 29 04/01/2016       CBC RESULTS:  Lab Results   Component Value Date    WBC 9.1 04/01/2016    RBC 4.48 04/01/2016    HGB 12.5 04/01/2016    HCT 38.7 04/01/2016    MCV 86 04/01/2016    MCH 27.9 04/01/2016    MCHC 32.3 04/01/2016    RDW 13.3 04/01/2016     04/01/2016       BMP RESULTS:  Lab Results   Component Value Date     06/28/2019    POTASSIUM 4.6 06/28/2019    CHLORIDE 107 06/28/2019    CO2 24 06/28/2019    ANIONGAP 9 06/28/2019     (A) 06/28/2019    BUN 17 06/28/2019    CR 0.84 06/28/2019    GFRESTIMATED >60 06/28/2019    GFRESTBLACK >60 06/28/2019    REGLA 9.6 06/28/2019        A1C RESULTS:  No results found for: A1C    INR RESULTS:  Lab Results   Component Value Date    INR 0.98 12/06/2011           CC  Hayley Hays MD  [address] [address]    Thank you for allowing me to participate in the care of your patient.      Sincerely,     Agustin Madden MD     [facility]    cc:   Hayley Hays MD  [address] [address]

## 2019-09-29 ENCOUNTER — HEALTH MAINTENANCE LETTER (OUTPATIENT)
Age: 64
End: 2019-09-29

## 2019-10-01 LAB
MDC_IDC_MSMT_BATTERY_DTM: NORMAL
MDC_IDC_MSMT_BATTERY_IMPEDANCE: 700 OHM
MDC_IDC_MSMT_BATTERY_REMAINING_LONGEVITY: 93 MO
MDC_IDC_MSMT_BATTERY_STATUS: NORMAL
MDC_IDC_MSMT_BATTERY_VOLTAGE: 2.78 V
MDC_IDC_MSMT_LEADCHNL_RA_IMPEDANCE_VALUE: 426 OHM
MDC_IDC_MSMT_LEADCHNL_RA_PACING_THRESHOLD_AMPLITUDE: 0.38 V
MDC_IDC_MSMT_LEADCHNL_RA_PACING_THRESHOLD_PULSEWIDTH: 0.4 MS
MDC_IDC_MSMT_LEADCHNL_RV_IMPEDANCE_VALUE: 818 OHM
MDC_IDC_MSMT_LEADCHNL_RV_PACING_THRESHOLD_AMPLITUDE: 0.75 V
MDC_IDC_MSMT_LEADCHNL_RV_PACING_THRESHOLD_PULSEWIDTH: 0.4 MS
MDC_IDC_PG_IMPLANT_DTM: NORMAL
MDC_IDC_PG_MFG: NORMAL
MDC_IDC_PG_MODEL: NORMAL
MDC_IDC_PG_SERIAL: NORMAL
MDC_IDC_PG_TYPE: NORMAL
MDC_IDC_SESS_CLINIC_NAME: NORMAL
MDC_IDC_SESS_DTM: NORMAL
MDC_IDC_SESS_TYPE: NORMAL
MDC_IDC_SET_BRADY_AT_MODE_SWITCH_MODE: NORMAL
MDC_IDC_SET_BRADY_AT_MODE_SWITCH_RATE: 175 {BEATS}/MIN
MDC_IDC_SET_BRADY_LOWRATE: 60 {BEATS}/MIN
MDC_IDC_SET_BRADY_MAX_SENSOR_RATE: 150 {BEATS}/MIN
MDC_IDC_SET_BRADY_MAX_TRACKING_RATE: 150 {BEATS}/MIN
MDC_IDC_SET_BRADY_MODE: NORMAL
MDC_IDC_SET_BRADY_PAV_DELAY_LOW: 150 MS
MDC_IDC_SET_BRADY_SAV_DELAY_LOW: 120 MS
MDC_IDC_SET_LEADCHNL_RA_PACING_AMPLITUDE: 1.5 V
MDC_IDC_SET_LEADCHNL_RA_PACING_ANODE_ELECTRODE_1: NORMAL
MDC_IDC_SET_LEADCHNL_RA_PACING_ANODE_LOCATION_1: NORMAL
MDC_IDC_SET_LEADCHNL_RA_PACING_CAPTURE_MODE: NORMAL
MDC_IDC_SET_LEADCHNL_RA_PACING_CATHODE_ELECTRODE_1: NORMAL
MDC_IDC_SET_LEADCHNL_RA_PACING_CATHODE_LOCATION_1: NORMAL
MDC_IDC_SET_LEADCHNL_RA_PACING_POLARITY: NORMAL
MDC_IDC_SET_LEADCHNL_RA_PACING_PULSEWIDTH: 0.4 MS
MDC_IDC_SET_LEADCHNL_RA_SENSING_ANODE_ELECTRODE_1: NORMAL
MDC_IDC_SET_LEADCHNL_RA_SENSING_ANODE_LOCATION_1: NORMAL
MDC_IDC_SET_LEADCHNL_RA_SENSING_CATHODE_ELECTRODE_1: NORMAL
MDC_IDC_SET_LEADCHNL_RA_SENSING_CATHODE_LOCATION_1: NORMAL
MDC_IDC_SET_LEADCHNL_RA_SENSING_POLARITY: NORMAL
MDC_IDC_SET_LEADCHNL_RA_SENSING_SENSITIVITY: 0.5 MV
MDC_IDC_SET_LEADCHNL_RV_PACING_AMPLITUDE: 2 V
MDC_IDC_SET_LEADCHNL_RV_PACING_ANODE_ELECTRODE_1: NORMAL
MDC_IDC_SET_LEADCHNL_RV_PACING_ANODE_LOCATION_1: NORMAL
MDC_IDC_SET_LEADCHNL_RV_PACING_CAPTURE_MODE: NORMAL
MDC_IDC_SET_LEADCHNL_RV_PACING_CATHODE_ELECTRODE_1: NORMAL
MDC_IDC_SET_LEADCHNL_RV_PACING_CATHODE_LOCATION_1: NORMAL
MDC_IDC_SET_LEADCHNL_RV_PACING_POLARITY: NORMAL
MDC_IDC_SET_LEADCHNL_RV_PACING_PULSEWIDTH: 0.4 MS
MDC_IDC_SET_LEADCHNL_RV_SENSING_ANODE_ELECTRODE_1: NORMAL
MDC_IDC_SET_LEADCHNL_RV_SENSING_ANODE_LOCATION_1: NORMAL
MDC_IDC_SET_LEADCHNL_RV_SENSING_CATHODE_ELECTRODE_1: NORMAL
MDC_IDC_SET_LEADCHNL_RV_SENSING_CATHODE_LOCATION_1: NORMAL
MDC_IDC_SET_LEADCHNL_RV_SENSING_POLARITY: NORMAL
MDC_IDC_SET_LEADCHNL_RV_SENSING_SENSITIVITY: 2.8 MV
MDC_IDC_SET_ZONE_DETECTION_INTERVAL: 333.33 MS
MDC_IDC_SET_ZONE_DETECTION_INTERVAL: 342.86 MS
MDC_IDC_SET_ZONE_TYPE: NORMAL
MDC_IDC_SET_ZONE_TYPE: NORMAL
MDC_IDC_STAT_AT_BURDEN_PERCENT: 0 %
MDC_IDC_STAT_AT_DTM_END: NORMAL
MDC_IDC_STAT_AT_DTM_START: NORMAL
MDC_IDC_STAT_AT_MODE_SW_COUNT: 0
MDC_IDC_STAT_BRADY_AP_VP_PERCENT: 0 %
MDC_IDC_STAT_BRADY_AP_VS_PERCENT: 24 %
MDC_IDC_STAT_BRADY_AS_VP_PERCENT: 0 %
MDC_IDC_STAT_BRADY_AS_VS_PERCENT: 75 %
MDC_IDC_STAT_BRADY_DTM_END: NORMAL
MDC_IDC_STAT_BRADY_DTM_START: NORMAL
MDC_IDC_STAT_EPISODE_RECENT_COUNT: 0
MDC_IDC_STAT_EPISODE_RECENT_COUNT: 0
MDC_IDC_STAT_EPISODE_RECENT_COUNT_DTM_END: NORMAL
MDC_IDC_STAT_EPISODE_RECENT_COUNT_DTM_END: NORMAL
MDC_IDC_STAT_EPISODE_RECENT_COUNT_DTM_START: NORMAL
MDC_IDC_STAT_EPISODE_RECENT_COUNT_DTM_START: NORMAL
MDC_IDC_STAT_EPISODE_TYPE: NORMAL
MDC_IDC_STAT_EPISODE_TYPE: NORMAL

## 2019-12-04 ENCOUNTER — ANCILLARY PROCEDURE (OUTPATIENT)
Dept: CARDIOLOGY | Facility: CLINIC | Age: 64
End: 2019-12-04
Attending: INTERNAL MEDICINE
Payer: COMMERCIAL

## 2019-12-04 DIAGNOSIS — Z95.0 PACEMAKER: ICD-10-CM

## 2019-12-04 PROCEDURE — 93280 PM DEVICE PROGR EVAL DUAL: CPT | Performed by: INTERNAL MEDICINE

## 2019-12-10 LAB
MDC_IDC_MSMT_BATTERY_DTM: NORMAL
MDC_IDC_MSMT_BATTERY_IMPEDANCE: 750 OHM
MDC_IDC_MSMT_BATTERY_REMAINING_LONGEVITY: 90 MO
MDC_IDC_MSMT_BATTERY_STATUS: NORMAL
MDC_IDC_MSMT_BATTERY_VOLTAGE: 2.78 V
MDC_IDC_MSMT_LEADCHNL_RA_IMPEDANCE_VALUE: 422 OHM
MDC_IDC_MSMT_LEADCHNL_RA_PACING_THRESHOLD_AMPLITUDE: 0.38 V
MDC_IDC_MSMT_LEADCHNL_RA_PACING_THRESHOLD_AMPLITUDE: 0.5 V
MDC_IDC_MSMT_LEADCHNL_RA_PACING_THRESHOLD_PULSEWIDTH: 0.4 MS
MDC_IDC_MSMT_LEADCHNL_RA_PACING_THRESHOLD_PULSEWIDTH: 0.4 MS
MDC_IDC_MSMT_LEADCHNL_RA_SENSING_INTR_AMPL: 4 MV
MDC_IDC_MSMT_LEADCHNL_RV_IMPEDANCE_VALUE: 770 OHM
MDC_IDC_MSMT_LEADCHNL_RV_PACING_THRESHOLD_AMPLITUDE: 0.75 V
MDC_IDC_MSMT_LEADCHNL_RV_PACING_THRESHOLD_AMPLITUDE: 1 V
MDC_IDC_MSMT_LEADCHNL_RV_PACING_THRESHOLD_PULSEWIDTH: 0.4 MS
MDC_IDC_MSMT_LEADCHNL_RV_PACING_THRESHOLD_PULSEWIDTH: 0.4 MS
MDC_IDC_MSMT_LEADCHNL_RV_SENSING_INTR_AMPL: 5.6 MV
MDC_IDC_PG_IMPLANT_DTM: NORMAL
MDC_IDC_PG_MFG: NORMAL
MDC_IDC_PG_MODEL: NORMAL
MDC_IDC_PG_SERIAL: NORMAL
MDC_IDC_PG_TYPE: NORMAL
MDC_IDC_SESS_CLINIC_NAME: NORMAL
MDC_IDC_SESS_DTM: NORMAL
MDC_IDC_SESS_TYPE: NORMAL
MDC_IDC_SET_BRADY_AT_MODE_SWITCH_MODE: NORMAL
MDC_IDC_SET_BRADY_AT_MODE_SWITCH_RATE: 175 {BEATS}/MIN
MDC_IDC_SET_BRADY_LOWRATE: 60 {BEATS}/MIN
MDC_IDC_SET_BRADY_MAX_SENSOR_RATE: 150 {BEATS}/MIN
MDC_IDC_SET_BRADY_MAX_TRACKING_RATE: 150 {BEATS}/MIN
MDC_IDC_SET_BRADY_MODE: NORMAL
MDC_IDC_SET_BRADY_PAV_DELAY_LOW: 150 MS
MDC_IDC_SET_BRADY_SAV_DELAY_LOW: 120 MS
MDC_IDC_SET_LEADCHNL_RA_PACING_AMPLITUDE: 1.5 V
MDC_IDC_SET_LEADCHNL_RA_PACING_ANODE_ELECTRODE_1: NORMAL
MDC_IDC_SET_LEADCHNL_RA_PACING_ANODE_LOCATION_1: NORMAL
MDC_IDC_SET_LEADCHNL_RA_PACING_CAPTURE_MODE: NORMAL
MDC_IDC_SET_LEADCHNL_RA_PACING_CATHODE_ELECTRODE_1: NORMAL
MDC_IDC_SET_LEADCHNL_RA_PACING_CATHODE_LOCATION_1: NORMAL
MDC_IDC_SET_LEADCHNL_RA_PACING_POLARITY: NORMAL
MDC_IDC_SET_LEADCHNL_RA_PACING_PULSEWIDTH: 0.4 MS
MDC_IDC_SET_LEADCHNL_RA_SENSING_ANODE_ELECTRODE_1: NORMAL
MDC_IDC_SET_LEADCHNL_RA_SENSING_ANODE_LOCATION_1: NORMAL
MDC_IDC_SET_LEADCHNL_RA_SENSING_CATHODE_ELECTRODE_1: NORMAL
MDC_IDC_SET_LEADCHNL_RA_SENSING_CATHODE_LOCATION_1: NORMAL
MDC_IDC_SET_LEADCHNL_RA_SENSING_POLARITY: NORMAL
MDC_IDC_SET_LEADCHNL_RA_SENSING_SENSITIVITY: 0.5 MV
MDC_IDC_SET_LEADCHNL_RV_PACING_AMPLITUDE: 2 V
MDC_IDC_SET_LEADCHNL_RV_PACING_ANODE_ELECTRODE_1: NORMAL
MDC_IDC_SET_LEADCHNL_RV_PACING_ANODE_LOCATION_1: NORMAL
MDC_IDC_SET_LEADCHNL_RV_PACING_CAPTURE_MODE: NORMAL
MDC_IDC_SET_LEADCHNL_RV_PACING_CATHODE_ELECTRODE_1: NORMAL
MDC_IDC_SET_LEADCHNL_RV_PACING_CATHODE_LOCATION_1: NORMAL
MDC_IDC_SET_LEADCHNL_RV_PACING_POLARITY: NORMAL
MDC_IDC_SET_LEADCHNL_RV_PACING_PULSEWIDTH: 0.4 MS
MDC_IDC_SET_LEADCHNL_RV_SENSING_ANODE_ELECTRODE_1: NORMAL
MDC_IDC_SET_LEADCHNL_RV_SENSING_ANODE_LOCATION_1: NORMAL
MDC_IDC_SET_LEADCHNL_RV_SENSING_CATHODE_ELECTRODE_1: NORMAL
MDC_IDC_SET_LEADCHNL_RV_SENSING_CATHODE_LOCATION_1: NORMAL
MDC_IDC_SET_LEADCHNL_RV_SENSING_POLARITY: NORMAL
MDC_IDC_SET_LEADCHNL_RV_SENSING_SENSITIVITY: 2.8 MV
MDC_IDC_SET_ZONE_DETECTION_INTERVAL: 333.33 MS
MDC_IDC_SET_ZONE_DETECTION_INTERVAL: 342.86 MS
MDC_IDC_SET_ZONE_TYPE: NORMAL
MDC_IDC_SET_ZONE_TYPE: NORMAL
MDC_IDC_STAT_AT_BURDEN_PERCENT: 0 %
MDC_IDC_STAT_AT_DTM_END: NORMAL
MDC_IDC_STAT_AT_DTM_START: NORMAL
MDC_IDC_STAT_AT_MODE_SW_COUNT: 0
MDC_IDC_STAT_BRADY_AP_VP_PERCENT: 0 %
MDC_IDC_STAT_BRADY_AP_VS_PERCENT: 24 %
MDC_IDC_STAT_BRADY_AS_VP_PERCENT: 0 %
MDC_IDC_STAT_BRADY_AS_VS_PERCENT: 75 %
MDC_IDC_STAT_BRADY_DTM_END: NORMAL
MDC_IDC_STAT_BRADY_DTM_START: NORMAL
MDC_IDC_STAT_BRADY_RA_PERCENT_PACED: 24 %
MDC_IDC_STAT_BRADY_RV_PERCENT_PACED: 0 %
MDC_IDC_STAT_EPISODE_RECENT_COUNT: 0
MDC_IDC_STAT_EPISODE_RECENT_COUNT: 0
MDC_IDC_STAT_EPISODE_RECENT_COUNT_DTM_END: NORMAL
MDC_IDC_STAT_EPISODE_RECENT_COUNT_DTM_END: NORMAL
MDC_IDC_STAT_EPISODE_RECENT_COUNT_DTM_START: NORMAL
MDC_IDC_STAT_EPISODE_RECENT_COUNT_DTM_START: NORMAL
MDC_IDC_STAT_EPISODE_TYPE: NORMAL
MDC_IDC_STAT_EPISODE_TYPE: NORMAL

## 2020-03-11 ENCOUNTER — ANCILLARY PROCEDURE (OUTPATIENT)
Dept: CARDIOLOGY | Facility: CLINIC | Age: 65
End: 2020-03-11
Attending: INTERNAL MEDICINE
Payer: COMMERCIAL

## 2020-03-11 DIAGNOSIS — Z95.0 CARDIAC PACEMAKER IN SITU: Primary | ICD-10-CM

## 2020-03-11 DIAGNOSIS — Z95.0 PACEMAKER: ICD-10-CM

## 2020-03-11 PROCEDURE — 93294 REM INTERROG EVL PM/LDLS PM: CPT | Performed by: INTERNAL MEDICINE

## 2020-03-11 PROCEDURE — 93296 REM INTERROG EVL PM/IDS: CPT | Performed by: INTERNAL MEDICINE

## 2020-03-17 LAB
MDC_IDC_MSMT_BATTERY_DTM: NORMAL
MDC_IDC_MSMT_BATTERY_IMPEDANCE: 851 OHM
MDC_IDC_MSMT_BATTERY_REMAINING_LONGEVITY: 84 MO
MDC_IDC_MSMT_BATTERY_STATUS: NORMAL
MDC_IDC_MSMT_BATTERY_VOLTAGE: 2.78 V
MDC_IDC_MSMT_LEADCHNL_RA_IMPEDANCE_VALUE: 450 OHM
MDC_IDC_MSMT_LEADCHNL_RA_PACING_THRESHOLD_AMPLITUDE: 0.38 V
MDC_IDC_MSMT_LEADCHNL_RA_PACING_THRESHOLD_PULSEWIDTH: 0.4 MS
MDC_IDC_MSMT_LEADCHNL_RV_IMPEDANCE_VALUE: 865 OHM
MDC_IDC_MSMT_LEADCHNL_RV_PACING_THRESHOLD_AMPLITUDE: 0.75 V
MDC_IDC_MSMT_LEADCHNL_RV_PACING_THRESHOLD_PULSEWIDTH: 0.4 MS
MDC_IDC_PG_IMPLANT_DTM: NORMAL
MDC_IDC_PG_MFG: NORMAL
MDC_IDC_PG_MODEL: NORMAL
MDC_IDC_PG_SERIAL: NORMAL
MDC_IDC_PG_TYPE: NORMAL
MDC_IDC_SESS_CLINIC_NAME: NORMAL
MDC_IDC_SESS_DTM: NORMAL
MDC_IDC_SESS_TYPE: NORMAL
MDC_IDC_SET_BRADY_AT_MODE_SWITCH_MODE: NORMAL
MDC_IDC_SET_BRADY_AT_MODE_SWITCH_RATE: 175 {BEATS}/MIN
MDC_IDC_SET_BRADY_LOWRATE: 60 {BEATS}/MIN
MDC_IDC_SET_BRADY_MAX_SENSOR_RATE: 150 {BEATS}/MIN
MDC_IDC_SET_BRADY_MAX_TRACKING_RATE: 150 {BEATS}/MIN
MDC_IDC_SET_BRADY_MODE: NORMAL
MDC_IDC_SET_BRADY_PAV_DELAY_LOW: 150 MS
MDC_IDC_SET_BRADY_SAV_DELAY_LOW: 120 MS
MDC_IDC_SET_LEADCHNL_RA_PACING_AMPLITUDE: 1.5 V
MDC_IDC_SET_LEADCHNL_RA_PACING_CAPTURE_MODE: NORMAL
MDC_IDC_SET_LEADCHNL_RA_PACING_POLARITY: NORMAL
MDC_IDC_SET_LEADCHNL_RA_PACING_PULSEWIDTH: 0.4 MS
MDC_IDC_SET_LEADCHNL_RA_SENSING_POLARITY: NORMAL
MDC_IDC_SET_LEADCHNL_RA_SENSING_SENSITIVITY: 0.5 MV
MDC_IDC_SET_LEADCHNL_RV_PACING_AMPLITUDE: 2 V
MDC_IDC_SET_LEADCHNL_RV_PACING_CAPTURE_MODE: NORMAL
MDC_IDC_SET_LEADCHNL_RV_PACING_POLARITY: NORMAL
MDC_IDC_SET_LEADCHNL_RV_PACING_PULSEWIDTH: 0.4 MS
MDC_IDC_SET_LEADCHNL_RV_SENSING_POLARITY: NORMAL
MDC_IDC_SET_LEADCHNL_RV_SENSING_SENSITIVITY: 2.8 MV
MDC_IDC_SET_ZONE_DETECTION_INTERVAL: 333.33 MS
MDC_IDC_SET_ZONE_DETECTION_INTERVAL: 342.86 MS
MDC_IDC_SET_ZONE_TYPE: NORMAL
MDC_IDC_SET_ZONE_TYPE: NORMAL
MDC_IDC_STAT_AT_BURDEN_PERCENT: 0 %
MDC_IDC_STAT_AT_DTM_END: NORMAL
MDC_IDC_STAT_AT_DTM_START: NORMAL
MDC_IDC_STAT_AT_MODE_SW_COUNT: 0
MDC_IDC_STAT_BRADY_AP_VP_PERCENT: 0 %
MDC_IDC_STAT_BRADY_AP_VS_PERCENT: 24 %
MDC_IDC_STAT_BRADY_AS_VP_PERCENT: 1 %
MDC_IDC_STAT_BRADY_AS_VS_PERCENT: 75 %
MDC_IDC_STAT_BRADY_DTM_END: NORMAL
MDC_IDC_STAT_BRADY_DTM_START: NORMAL
MDC_IDC_STAT_EPISODE_RECENT_COUNT: 0
MDC_IDC_STAT_EPISODE_RECENT_COUNT: 0
MDC_IDC_STAT_EPISODE_RECENT_COUNT_DTM_END: NORMAL
MDC_IDC_STAT_EPISODE_RECENT_COUNT_DTM_END: NORMAL
MDC_IDC_STAT_EPISODE_RECENT_COUNT_DTM_START: NORMAL
MDC_IDC_STAT_EPISODE_RECENT_COUNT_DTM_START: NORMAL
MDC_IDC_STAT_EPISODE_TYPE: NORMAL
MDC_IDC_STAT_EPISODE_TYPE: NORMAL

## 2020-06-22 ENCOUNTER — ANCILLARY PROCEDURE (OUTPATIENT)
Dept: CARDIOLOGY | Facility: CLINIC | Age: 65
End: 2020-06-22
Attending: INTERNAL MEDICINE
Payer: COMMERCIAL

## 2020-06-22 DIAGNOSIS — Z95.0 CARDIAC PACEMAKER IN SITU: ICD-10-CM

## 2020-06-22 PROCEDURE — 93296 REM INTERROG EVL PM/IDS: CPT | Performed by: INTERNAL MEDICINE

## 2020-06-22 PROCEDURE — 93294 REM INTERROG EVL PM/LDLS PM: CPT | Performed by: INTERNAL MEDICINE

## 2020-06-23 LAB
MDC_IDC_MSMT_BATTERY_DTM: NORMAL
MDC_IDC_MSMT_BATTERY_IMPEDANCE: 931 OHM
MDC_IDC_MSMT_BATTERY_REMAINING_LONGEVITY: 80 MO
MDC_IDC_MSMT_BATTERY_STATUS: NORMAL
MDC_IDC_MSMT_BATTERY_VOLTAGE: 2.78 V
MDC_IDC_MSMT_LEADCHNL_RA_IMPEDANCE_VALUE: 438 OHM
MDC_IDC_MSMT_LEADCHNL_RA_PACING_THRESHOLD_AMPLITUDE: 0.38 V
MDC_IDC_MSMT_LEADCHNL_RA_PACING_THRESHOLD_PULSEWIDTH: 0.4 MS
MDC_IDC_MSMT_LEADCHNL_RV_IMPEDANCE_VALUE: 833 OHM
MDC_IDC_MSMT_LEADCHNL_RV_PACING_THRESHOLD_AMPLITUDE: 0.5 V
MDC_IDC_MSMT_LEADCHNL_RV_PACING_THRESHOLD_PULSEWIDTH: 0.4 MS
MDC_IDC_PG_IMPLANT_DTM: NORMAL
MDC_IDC_PG_MFG: NORMAL
MDC_IDC_PG_MODEL: NORMAL
MDC_IDC_PG_SERIAL: NORMAL
MDC_IDC_PG_TYPE: NORMAL
MDC_IDC_SESS_CLINIC_NAME: NORMAL
MDC_IDC_SESS_DTM: NORMAL
MDC_IDC_SESS_TYPE: NORMAL
MDC_IDC_SET_BRADY_AT_MODE_SWITCH_MODE: NORMAL
MDC_IDC_SET_BRADY_AT_MODE_SWITCH_RATE: 175 {BEATS}/MIN
MDC_IDC_SET_BRADY_LOWRATE: 60 {BEATS}/MIN
MDC_IDC_SET_BRADY_MAX_SENSOR_RATE: 150 {BEATS}/MIN
MDC_IDC_SET_BRADY_MAX_TRACKING_RATE: 150 {BEATS}/MIN
MDC_IDC_SET_BRADY_MODE: NORMAL
MDC_IDC_SET_BRADY_PAV_DELAY_LOW: 150 MS
MDC_IDC_SET_BRADY_SAV_DELAY_LOW: 120 MS
MDC_IDC_SET_LEADCHNL_RA_PACING_AMPLITUDE: 1.5 V
MDC_IDC_SET_LEADCHNL_RA_PACING_CAPTURE_MODE: NORMAL
MDC_IDC_SET_LEADCHNL_RA_PACING_POLARITY: NORMAL
MDC_IDC_SET_LEADCHNL_RA_PACING_PULSEWIDTH: 0.4 MS
MDC_IDC_SET_LEADCHNL_RA_SENSING_POLARITY: NORMAL
MDC_IDC_SET_LEADCHNL_RA_SENSING_SENSITIVITY: 0.5 MV
MDC_IDC_SET_LEADCHNL_RV_PACING_AMPLITUDE: 2 V
MDC_IDC_SET_LEADCHNL_RV_PACING_CAPTURE_MODE: NORMAL
MDC_IDC_SET_LEADCHNL_RV_PACING_POLARITY: NORMAL
MDC_IDC_SET_LEADCHNL_RV_PACING_PULSEWIDTH: 0.4 MS
MDC_IDC_SET_LEADCHNL_RV_SENSING_POLARITY: NORMAL
MDC_IDC_SET_LEADCHNL_RV_SENSING_SENSITIVITY: 2 MV
MDC_IDC_SET_ZONE_DETECTION_INTERVAL: 333.33 MS
MDC_IDC_SET_ZONE_DETECTION_INTERVAL: 342.86 MS
MDC_IDC_SET_ZONE_TYPE: NORMAL
MDC_IDC_SET_ZONE_TYPE: NORMAL
MDC_IDC_STAT_AT_BURDEN_PERCENT: 0 %
MDC_IDC_STAT_AT_DTM_END: NORMAL
MDC_IDC_STAT_AT_DTM_START: NORMAL
MDC_IDC_STAT_AT_MODE_SW_COUNT: 0
MDC_IDC_STAT_BRADY_AP_VP_PERCENT: 0 %
MDC_IDC_STAT_BRADY_AP_VS_PERCENT: 25 %
MDC_IDC_STAT_BRADY_AS_VP_PERCENT: 1 %
MDC_IDC_STAT_BRADY_AS_VS_PERCENT: 73 %
MDC_IDC_STAT_BRADY_DTM_END: NORMAL
MDC_IDC_STAT_BRADY_DTM_START: NORMAL
MDC_IDC_STAT_EPISODE_RECENT_COUNT: 0
MDC_IDC_STAT_EPISODE_RECENT_COUNT: 0
MDC_IDC_STAT_EPISODE_RECENT_COUNT_DTM_END: NORMAL
MDC_IDC_STAT_EPISODE_RECENT_COUNT_DTM_END: NORMAL
MDC_IDC_STAT_EPISODE_RECENT_COUNT_DTM_START: NORMAL
MDC_IDC_STAT_EPISODE_RECENT_COUNT_DTM_START: NORMAL
MDC_IDC_STAT_EPISODE_TYPE: NORMAL
MDC_IDC_STAT_EPISODE_TYPE: NORMAL

## 2020-07-29 ENCOUNTER — HOSPITAL ENCOUNTER (OUTPATIENT)
Dept: MAMMOGRAPHY | Facility: CLINIC | Age: 65
Discharge: HOME | End: 2020-07-29
Attending: FAMILY MEDICINE | Admitting: FAMILY MEDICINE
Payer: COMMERCIAL

## 2020-07-29 DIAGNOSIS — Z12.31 VISIT FOR SCREENING MAMMOGRAM: ICD-10-CM

## 2020-07-29 PROCEDURE — 77067 SCR MAMMO BI INCL CAD: CPT

## 2020-09-16 LAB
ANION GAP SERPL CALCULATED.3IONS-SCNC: 11 MMOL/L (ref 5–18)
BUN SERPL-MCNC: 17 MG/DL (ref 8–25)
CALCIUM SERPL-MCNC: 9.5 MG/DL (ref 8.5–10.5)
CHLORIDE SERPLBLD-SCNC: 106 MMOL/L (ref 90–110)
CHOL/HDL RATIO - HISTORICAL: 3.74
CHOLEST SERPL-MCNC: 213 MG/DL (ref 100–199)
CO2 SERPL-SCNC: 24 MMOL/L (ref 21–31)
CREAT SERPL-MCNC: 0.87 MG/DL (ref 0.57–1.11)
GFR SERPL CREATININE-BSD FRML MDRD: >60 ML/MIN/1.73M2
GLUCOSE SERPL-MCNC: 143 MG/DL (ref 65–100)
HDLC SERPL-MCNC: 57 MG/DL
LDLC SERPL CALC-MCNC: 124 MG/DL
NONHDLC SERPL-MCNC: 156 MG/DL
POTASSIUM SERPL-SCNC: 4.7 MMOL/L (ref 3.5–5)
SODIUM SERPL-SCNC: 141 MMOL/L (ref 135–145)
TRIGL SERPL-MCNC: 162 MG/DL

## 2020-09-28 ENCOUNTER — ANCILLARY PROCEDURE (OUTPATIENT)
Dept: CARDIOLOGY | Facility: CLINIC | Age: 65
End: 2020-09-28
Attending: INTERNAL MEDICINE
Payer: COMMERCIAL

## 2020-09-28 DIAGNOSIS — Z95.0 CARDIAC PACEMAKER IN SITU: ICD-10-CM

## 2020-09-28 PROCEDURE — 93294 REM INTERROG EVL PM/LDLS PM: CPT | Performed by: INTERNAL MEDICINE

## 2020-09-28 PROCEDURE — 93296 REM INTERROG EVL PM/IDS: CPT | Performed by: INTERNAL MEDICINE

## 2020-10-26 LAB
MDC_IDC_MSMT_BATTERY_DTM: NORMAL
MDC_IDC_MSMT_BATTERY_IMPEDANCE: 1064 OHM
MDC_IDC_MSMT_BATTERY_REMAINING_LONGEVITY: 74 MO
MDC_IDC_MSMT_BATTERY_STATUS: NORMAL
MDC_IDC_MSMT_BATTERY_VOLTAGE: 2.77 V
MDC_IDC_MSMT_LEADCHNL_RA_IMPEDANCE_VALUE: 426 OHM
MDC_IDC_MSMT_LEADCHNL_RA_PACING_THRESHOLD_AMPLITUDE: 0.38 V
MDC_IDC_MSMT_LEADCHNL_RA_PACING_THRESHOLD_PULSEWIDTH: 0.4 MS
MDC_IDC_MSMT_LEADCHNL_RV_IMPEDANCE_VALUE: 802 OHM
MDC_IDC_MSMT_LEADCHNL_RV_PACING_THRESHOLD_AMPLITUDE: 0.62 V
MDC_IDC_MSMT_LEADCHNL_RV_PACING_THRESHOLD_PULSEWIDTH: 0.4 MS
MDC_IDC_PG_IMPLANT_DTM: NORMAL
MDC_IDC_PG_MFG: NORMAL
MDC_IDC_PG_MODEL: NORMAL
MDC_IDC_PG_SERIAL: NORMAL
MDC_IDC_PG_TYPE: NORMAL
MDC_IDC_SESS_CLINIC_NAME: NORMAL
MDC_IDC_SESS_DTM: NORMAL
MDC_IDC_SESS_TYPE: NORMAL
MDC_IDC_SET_BRADY_AT_MODE_SWITCH_MODE: NORMAL
MDC_IDC_SET_BRADY_AT_MODE_SWITCH_RATE: 175 {BEATS}/MIN
MDC_IDC_SET_BRADY_LOWRATE: 60 {BEATS}/MIN
MDC_IDC_SET_BRADY_MAX_SENSOR_RATE: 150 {BEATS}/MIN
MDC_IDC_SET_BRADY_MAX_TRACKING_RATE: 150 {BEATS}/MIN
MDC_IDC_SET_BRADY_MODE: NORMAL
MDC_IDC_SET_BRADY_PAV_DELAY_LOW: 150 MS
MDC_IDC_SET_BRADY_SAV_DELAY_LOW: 120 MS
MDC_IDC_SET_LEADCHNL_RA_PACING_AMPLITUDE: 1.5 V
MDC_IDC_SET_LEADCHNL_RA_PACING_CAPTURE_MODE: NORMAL
MDC_IDC_SET_LEADCHNL_RA_PACING_POLARITY: NORMAL
MDC_IDC_SET_LEADCHNL_RA_PACING_PULSEWIDTH: 0.4 MS
MDC_IDC_SET_LEADCHNL_RA_SENSING_POLARITY: NORMAL
MDC_IDC_SET_LEADCHNL_RA_SENSING_SENSITIVITY: 0.5 MV
MDC_IDC_SET_LEADCHNL_RV_PACING_AMPLITUDE: 2 V
MDC_IDC_SET_LEADCHNL_RV_PACING_CAPTURE_MODE: NORMAL
MDC_IDC_SET_LEADCHNL_RV_PACING_POLARITY: NORMAL
MDC_IDC_SET_LEADCHNL_RV_PACING_PULSEWIDTH: 0.4 MS
MDC_IDC_SET_LEADCHNL_RV_SENSING_POLARITY: NORMAL
MDC_IDC_SET_LEADCHNL_RV_SENSING_SENSITIVITY: 2 MV
MDC_IDC_SET_ZONE_DETECTION_INTERVAL: 333.33 MS
MDC_IDC_SET_ZONE_DETECTION_INTERVAL: 342.86 MS
MDC_IDC_SET_ZONE_TYPE: NORMAL
MDC_IDC_SET_ZONE_TYPE: NORMAL
MDC_IDC_STAT_AT_BURDEN_PERCENT: 0 %
MDC_IDC_STAT_AT_DTM_END: NORMAL
MDC_IDC_STAT_AT_DTM_START: NORMAL
MDC_IDC_STAT_AT_MODE_SW_COUNT: 0
MDC_IDC_STAT_BRADY_AP_VP_PERCENT: 0 %
MDC_IDC_STAT_BRADY_AP_VS_PERCENT: 26 %
MDC_IDC_STAT_BRADY_AS_VP_PERCENT: 2 %
MDC_IDC_STAT_BRADY_AS_VS_PERCENT: 72 %
MDC_IDC_STAT_BRADY_DTM_END: NORMAL
MDC_IDC_STAT_BRADY_DTM_START: NORMAL
MDC_IDC_STAT_EPISODE_RECENT_COUNT: 0
MDC_IDC_STAT_EPISODE_RECENT_COUNT: 0
MDC_IDC_STAT_EPISODE_RECENT_COUNT_DTM_END: NORMAL
MDC_IDC_STAT_EPISODE_RECENT_COUNT_DTM_END: NORMAL
MDC_IDC_STAT_EPISODE_RECENT_COUNT_DTM_START: NORMAL
MDC_IDC_STAT_EPISODE_RECENT_COUNT_DTM_START: NORMAL
MDC_IDC_STAT_EPISODE_TYPE: NORMAL
MDC_IDC_STAT_EPISODE_TYPE: NORMAL

## 2021-01-04 ENCOUNTER — PRE VISIT (OUTPATIENT)
Dept: CARDIOLOGY | Facility: CLINIC | Age: 66
End: 2021-01-04

## 2021-01-04 ENCOUNTER — ANCILLARY PROCEDURE (OUTPATIENT)
Dept: CARDIOLOGY | Facility: CLINIC | Age: 66
End: 2021-01-04
Attending: INTERNAL MEDICINE
Payer: COMMERCIAL

## 2021-01-04 DIAGNOSIS — Z95.0 CARDIAC PACEMAKER IN SITU: ICD-10-CM

## 2021-01-04 PROCEDURE — 93296 REM INTERROG EVL PM/IDS: CPT | Performed by: INTERNAL MEDICINE

## 2021-01-04 PROCEDURE — 93294 REM INTERROG EVL PM/LDLS PM: CPT | Performed by: INTERNAL MEDICINE

## 2021-01-06 ENCOUNTER — OFFICE VISIT (OUTPATIENT)
Dept: CARDIOLOGY | Facility: CLINIC | Age: 66
End: 2021-01-06
Payer: COMMERCIAL

## 2021-01-06 VITALS
DIASTOLIC BLOOD PRESSURE: 75 MMHG | BODY MASS INDEX: 44.12 KG/M2 | HEART RATE: 64 BPM | SYSTOLIC BLOOD PRESSURE: 116 MMHG | WEIGHT: 249 LBS | HEIGHT: 63 IN

## 2021-01-06 DIAGNOSIS — I45.9 HEART BLOCK: Primary | ICD-10-CM

## 2021-01-06 DIAGNOSIS — E66.01 MORBID OBESITY (H): ICD-10-CM

## 2021-01-06 LAB
MDC_IDC_MSMT_BATTERY_DTM: NORMAL
MDC_IDC_MSMT_BATTERY_IMPEDANCE: 1087 OHM
MDC_IDC_MSMT_BATTERY_REMAINING_LONGEVITY: 73 MO
MDC_IDC_MSMT_BATTERY_STATUS: NORMAL
MDC_IDC_MSMT_BATTERY_VOLTAGE: 2.78 V
MDC_IDC_MSMT_LEADCHNL_RA_IMPEDANCE_VALUE: 432 OHM
MDC_IDC_MSMT_LEADCHNL_RA_PACING_THRESHOLD_AMPLITUDE: 0.38 V
MDC_IDC_MSMT_LEADCHNL_RA_PACING_THRESHOLD_PULSEWIDTH: 0.4 MS
MDC_IDC_MSMT_LEADCHNL_RV_IMPEDANCE_VALUE: 801 OHM
MDC_IDC_MSMT_LEADCHNL_RV_PACING_THRESHOLD_AMPLITUDE: 0.88 V
MDC_IDC_MSMT_LEADCHNL_RV_PACING_THRESHOLD_PULSEWIDTH: 0.4 MS
MDC_IDC_PG_IMPLANT_DTM: NORMAL
MDC_IDC_PG_MFG: NORMAL
MDC_IDC_PG_MODEL: NORMAL
MDC_IDC_PG_SERIAL: NORMAL
MDC_IDC_PG_TYPE: NORMAL
MDC_IDC_SESS_CLINIC_NAME: NORMAL
MDC_IDC_SESS_DTM: NORMAL
MDC_IDC_SESS_TYPE: NORMAL
MDC_IDC_SET_BRADY_AT_MODE_SWITCH_MODE: NORMAL
MDC_IDC_SET_BRADY_AT_MODE_SWITCH_RATE: 175 {BEATS}/MIN
MDC_IDC_SET_BRADY_LOWRATE: 60 {BEATS}/MIN
MDC_IDC_SET_BRADY_MAX_SENSOR_RATE: 150 {BEATS}/MIN
MDC_IDC_SET_BRADY_MAX_TRACKING_RATE: 150 {BEATS}/MIN
MDC_IDC_SET_BRADY_MODE: NORMAL
MDC_IDC_SET_BRADY_PAV_DELAY_LOW: 150 MS
MDC_IDC_SET_BRADY_SAV_DELAY_LOW: 120 MS
MDC_IDC_SET_LEADCHNL_RA_PACING_AMPLITUDE: 1.5 V
MDC_IDC_SET_LEADCHNL_RA_PACING_CAPTURE_MODE: NORMAL
MDC_IDC_SET_LEADCHNL_RA_PACING_POLARITY: NORMAL
MDC_IDC_SET_LEADCHNL_RA_PACING_PULSEWIDTH: 0.4 MS
MDC_IDC_SET_LEADCHNL_RA_SENSING_POLARITY: NORMAL
MDC_IDC_SET_LEADCHNL_RA_SENSING_SENSITIVITY: 0.5 MV
MDC_IDC_SET_LEADCHNL_RV_PACING_AMPLITUDE: 2 V
MDC_IDC_SET_LEADCHNL_RV_PACING_CAPTURE_MODE: NORMAL
MDC_IDC_SET_LEADCHNL_RV_PACING_POLARITY: NORMAL
MDC_IDC_SET_LEADCHNL_RV_PACING_PULSEWIDTH: 0.4 MS
MDC_IDC_SET_LEADCHNL_RV_SENSING_POLARITY: NORMAL
MDC_IDC_SET_LEADCHNL_RV_SENSING_SENSITIVITY: 2 MV
MDC_IDC_SET_ZONE_DETECTION_INTERVAL: 333.33 MS
MDC_IDC_SET_ZONE_DETECTION_INTERVAL: 342.86 MS
MDC_IDC_SET_ZONE_TYPE: NORMAL
MDC_IDC_SET_ZONE_TYPE: NORMAL
MDC_IDC_STAT_AT_BURDEN_PERCENT: 0 %
MDC_IDC_STAT_AT_DTM_END: NORMAL
MDC_IDC_STAT_AT_DTM_START: NORMAL
MDC_IDC_STAT_AT_MODE_SW_COUNT: 0
MDC_IDC_STAT_BRADY_AP_VP_PERCENT: 0 %
MDC_IDC_STAT_BRADY_AP_VS_PERCENT: 26 %
MDC_IDC_STAT_BRADY_AS_VP_PERCENT: 2 %
MDC_IDC_STAT_BRADY_AS_VS_PERCENT: 71 %
MDC_IDC_STAT_BRADY_DTM_END: NORMAL
MDC_IDC_STAT_BRADY_DTM_START: NORMAL
MDC_IDC_STAT_EPISODE_RECENT_COUNT: 0
MDC_IDC_STAT_EPISODE_RECENT_COUNT: 1
MDC_IDC_STAT_EPISODE_RECENT_COUNT_DTM_END: NORMAL
MDC_IDC_STAT_EPISODE_RECENT_COUNT_DTM_END: NORMAL
MDC_IDC_STAT_EPISODE_RECENT_COUNT_DTM_START: NORMAL
MDC_IDC_STAT_EPISODE_RECENT_COUNT_DTM_START: NORMAL
MDC_IDC_STAT_EPISODE_TYPE: NORMAL
MDC_IDC_STAT_EPISODE_TYPE: NORMAL

## 2021-01-06 PROCEDURE — 99214 OFFICE O/P EST MOD 30 MIN: CPT | Performed by: INTERNAL MEDICINE

## 2021-01-06 ASSESSMENT — MIFFLIN-ST. JEOR: SCORE: 1653.59

## 2021-01-06 NOTE — PROGRESS NOTES
Service Date: 01/06/2021      I had the  pleasure of following up on our mutual patient, Emmy Sawyer.  As you know, she is a delightful 65-year-old woman.  She has a pacemaker in place for what was really AV block, although interestingly all of her pacer checks lately have been shown that she is pacing in the atrium 20%-30% of the time and in the ventricle only 2% of the time.  The patient noted 1 very brief SVT episode for 6 beats.  The patient did not feel it.  She has no dizziness, no syncope.        She had a stress test a number of years ago which was negative for ischemia.  She reports no other cardiovascular complaints, no chest pain, dizziness, heart failure symptoms.        She had a number of questions for me today, most of which I know you have already discussed with her, because she mentioned it, but she asked me about bariatric surgery.  I did show her some studies that showed that bariatric surgery is designed to try to limit end-organ damage, which fortunately at this point, she does not have, but she does have hyperglycemia with a hemoglobin A1c of 6.5%.  I gave her Dr. Tao Isabel's name and I discussed the fact that that probably do an intensive diet discussion, sometimes a psychiatrist and a nutritionist will see the patient before jumping forward.        I also reviewed her cholesterol numbers.  Her LDL cholesterol was 124.  I would like to see her LDL below 100.  One could even make a case for closer to 70.  I discussed with her the Lipitor MARIANO trial, which is where some of his data came for more intense therapy.  We talked about metabolic syndrome, for which she certainly has a number of features.  We talked about some additional diet plans.      Today's visit ended up being 40 minutes.  Again, we reviewed the pacer, which is working well.  We touched on metabolic syndrome, diet, bariatric surgery, end-organ damage.  Her last echo was 2016 and it looked fine.  I probably will not do another  echo until probably .      Thank you for allowing me to see Ms. Shearer.  Today's visit was 40 minutes, greater than 50% counseling.      cc:      Hayley Hays MD    [address] [address]         GEOVANNA ALANIZ MD             D: 2021   T: 2021   MT: TUNG      Name:     DEBORAH SHEARER   MRN:      [id number]        Account:      [id number]   :      1955           Service Date: 2021      Document: [id number]

## 2021-01-06 NOTE — LETTER
1/6/2021      Hayley Hays MD  [facility] [address]  [facility] [address]      RE: Emmy Sawyer       Dear Colleague,    I had the pleasure of seeing Emmy Sawyer in the [facility].    Service Date: 01/06/2021      I had the  pleasure of following up on our mutual patient, Emmy Sawyer.  As you know, she is a delightful 65-year-old woman.  She has a pacemaker in place for what was really AV block, although interestingly all of her pacer checks lately have been shown that she is pacing in the atrium 20%-30% of the time and in the ventricle only 2% of the time.  The patient noted 1 very brief SVT episode for 6 beats.  The patient did not feel it.  She has no dizziness, no syncope.        She had a stress test a number of years ago which was negative for ischemia.  She reports no other cardiovascular complaints, no chest pain, dizziness, heart failure symptoms.        She had a number of questions for me today, most of which I know you have already discussed with her, because she mentioned it, but she asked me about bariatric surgery.  I did show her some studies that showed that bariatric surgery is designed to try to limit end-organ damage, which fortunately at this point, she does not have, but she does have hyperglycemia with a hemoglobin A1c of 6.5%.  I gave her Dr. Tao Isabel's name and I discussed the fact that that probably do an intensive diet discussion, sometimes a psychiatrist and a nutritionist will see the patient before jumping forward.        I also reviewed her cholesterol numbers.  Her LDL cholesterol was 124.  I would like to see her LDL below 100.  One could even make a case for closer to 70.  I discussed with her the Lipitor MARIANO trial, which is where some of his data came for more intense therapy.  We talked about metabolic syndrome, for which she certainly has a number of features.  We talked about some additional diet plans.       Today's visit ended up being 40 minutes.  Again, we reviewed the pacer, which is working well.  We touched on metabolic syndrome, diet, bariatric surgery, end-organ damage.  Her last echo was  and it looked fine.  I probably will not do another echo until probably .      Thank you for allowing me to see Ms. Sawyer.  Today's visit was 40 minutes, greater than 50% counseling.      cc:      [place] [address]         AGUSTIN ALANIZ MD             D: 2021   T: 2021   MT: TUNG      Name:     DEBORAH SAWYER   MRN:      [id number]        Account:      [id number]   :      1955           Service Date: 2021      Document: [id number]      Outpatient Encounter Medications as of 2021   Medication Sig Dispense Refill     Cholecalciferol (VITAMIN D) 2000 UNIT CAPS Take 1 tablet by mouth.       metoprolol (TOPROL-XL) 25 MG 24 hr tablet Take 25 mg by mouth At Bedtime.       omeprazole (PRILOSEC) 20 MG capsule Take 20 mg by mouth daily.       order for DME CPAP every night       rosuvastatin (CRESTOR) 20 MG tablet Take 10 mg by mouth daily.       sertraline (ZOLOFT) 50 MG tablet Take 50 mg by mouth At Bedtime.       cycloSPORINE (RESTASIS) 0.05 % ophthalmic emulsion 1 drop 2 times daily       No facility-administered encounter medications on file as of 2021.      Again, thank you for allowing me to participate in the care of your patient.      Sincerely,    Agustin Alaniz MD     [facility]

## 2021-01-06 NOTE — LETTER
1/6/2021    Hayley Hays MD  [facility] [address] [address] [facility] [address]    RE: Emmy Sawyer       Dear Colleague,    I had the pleasure of seeing Emmy Sawyer in the [facility].    HPI and Plan:   See dictation    No orders of the defined types were placed in this encounter.    No orders of the defined types were placed in this encounter.    There are no discontinued medications.      Encounter Diagnoses   Name Primary?     Heart block Yes     Morbid obesity (H)        CURRENT MEDICATIONS:  Current Outpatient Medications   Medication Sig Dispense Refill     Cholecalciferol (VITAMIN D) 2000 UNIT CAPS Take 1 tablet by mouth.       metoprolol (TOPROL-XL) 25 MG 24 hr tablet Take 25 mg by mouth At Bedtime.       omeprazole (PRILOSEC) 20 MG capsule Take 20 mg by mouth daily.       order for DME CPAP every night       rosuvastatin (CRESTOR) 20 MG tablet Take 10 mg by mouth daily.       sertraline (ZOLOFT) 50 MG tablet Take 50 mg by mouth At Bedtime.       cycloSPORINE (RESTASIS) 0.05 % ophthalmic emulsion 1 drop 2 times daily         ALLERGIES     Allergies   Allergen Reactions     Penicillins Rash       PAST MEDICAL HISTORY:  Past Medical History:   Diagnosis Date     Angina pectoris     normal cor artery-?syndrome X, ?Prinzmetal's     Anxiety      Arrhythmia     PVC     Costochondritis      Depression      GERD (gastroesophageal reflux disease)     normal EGD     Hyperlipidaemia      Hypertension      Impaired fasting glucose      Migraines      Pacemaker     Medtronic manny mclaughlin pacemaker serial [id number]     Second degree heart block 2003    Medtronic ebonia  pacemaker serial [id number]     Shingles      Sleep apnea     CPAP       PAST SURGICAL HISTORY:  Past Surgical History:   Procedure Laterality Date     DILATION AND CURETTAGE  1/13/2012    Procedure:DILATION AND CURETTAGE; DILATION AND CURETTAGE ; Surgeon:NIKI QUEEN; Location: OR       PERM PACER DBLE LEAD       IMPLANT PACEMAKER       LAPAROSCOPIC HYSTERECTOMY TOTAL  2012    Procedure:LAPAROSCOPIC HYSTERECTOMY TOTAL; LAPAROSCOPIC TOTAL HYSTERECTOMY , BILATERAL SALPINGO-OOPHERECTOMY; Surgeon:NIKI QUEEN; Location:[facility]     TUBAL LIGATION         FAMILY HISTORY:  Family History   Problem Relation Age of Onset     Hypertension Mother      Hyperlipidemia Mother      Hypertension Father      Diabetes Father      Hyperlipidemia Father      Myocardial Infarction Father 84     Myocardial Infarction Maternal Grandfather      Myocardial Infarction Paternal Grandmother      Heart Disease Daughter         minor MVP       SOCIAL HISTORY:  Social History     Socioeconomic History     Marital status:      Spouse name: None     Number of children: None     Years of education: None     Highest education level: None   Occupational History     None   Social Needs     Financial resource strain: None     Food insecurity     Worry: None     Inability: None     Transportation needs     Medical: None     Non-medical: None   Tobacco Use     Smoking status: Former Smoker     Quit date: 2008     Years since quittin.0     Smokeless tobacco: Never Used   Substance and Sexual Activity     Alcohol use: Yes     Comment: 2 drinks a month     Drug use: No     Sexual activity: None   Lifestyle     Physical activity     Days per week: None     Minutes per session: None     Stress: None   Relationships     Social connections     Talks on phone: None     Gets together: None     Attends Judaism service: None     Active member of club or organization: None     Attends meetings of clubs or organizations: None     Relationship status: None     Intimate partner violence     Fear of current or ex partner: None     Emotionally abused: None     Physically abused: None     Forced sexual activity: None   Other Topics Concern     Parent/sibling w/ CABG, MI or angioplasty before 65F 55M? Not Asked       "Service Not Asked     Blood Transfusions Not Asked     Caffeine Concern No     Comment: 1-2 cups per day     Occupational Exposure Not Asked     Hobby Hazards Not Asked     Sleep Concern Yes     Comment: sleep apnea , cpap     Stress Concern Not Asked     Weight Concern No     Special Diet No     Back Care Not Asked     Exercise Yes     Comment: some walking 2-3 days a week     Bike Helmet Not Asked     Seat Belt Yes     Self-Exams Not Asked   Social History Narrative     None       Review of Systems:  Skin:  Negative     Eyes:  Negative    ENT:  Negative    Respiratory:  Negative    Cardiovascular:  Negative    Gastroenterology: Negative    Genitourinary:  Negative    Musculoskeletal:  Negative    Neurologic:  Negative    Psychiatric:  Negative    Heme/Lymph/Imm:  Negative    Endocrine:  Negative      Physical Exam:  Vitals: /75   Pulse 64   Ht 1.6 m (5' 3\")   Wt 112.9 kg (249 lb)   BMI 44.11 kg/m      Constitutional:  cooperative, alert and oriented, well developed, well nourished, in no acute distress        Skin:  warm and dry to the touch, no apparent skin lesions or masses noted   pacemaker incision in the left infraclavicular area was well-healed      Head:  normocephalic, no masses or lesions        Eyes:  pupils equal and round, conjunctivae and lids unremarkable, sclera white, no xanthalasma, EOMS intact, no nystagmus        Lymph:No Cervical lymphadenopathy present;No thyromegaly     ENT:  no pallor or cyanosis, dentition good        Neck:  carotid pulses are full and equal bilaterally, JVP normal, no carotid bruit        Respiratory:  normal breath sounds, clear to auscultation, normal A-P diameter, normal symmetry, normal respiratory excursion, no use of accessory muscles         Cardiac: regular rhythm, normal S1/S2, no S3 or S4, apical impulse not displaced, no murmurs, gallops or rubs                  radial-femoral delay                                      GI:  abdomen soft, non-tender, " BS normoactive, no mass, no HSM, no bruits obese      Extremities and Muscular Skeletal:  no deformities, clubbing, cyanosis, erythema observed              Neurological:  no gross motor deficits        Psych:  Alert and Oriented x 3      Recent Lab Results:  LIPID RESULTS:  Lab Results   Component Value Date    CHOL 213 (H) 09/16/2020    HDL 57 09/16/2020     09/16/2020    TRIG 162 (H) 09/16/2020    CHOLHDLRATIO 3.68 05/30/2014       LIVER ENZYME RESULTS:  Lab Results   Component Value Date    AST 16 04/01/2016    ALT 29 04/01/2016       CBC RESULTS:  Lab Results   Component Value Date    WBC 9.1 04/01/2016    RBC 4.48 04/01/2016    HGB 12.5 04/01/2016    HCT 38.7 04/01/2016    MCV 86 04/01/2016    MCH 27.9 04/01/2016    MCHC 32.3 04/01/2016    RDW 13.3 04/01/2016     04/01/2016       BMP RESULTS:  Lab Results   Component Value Date     09/16/2020    POTASSIUM 4.7 09/16/2020    CHLORIDE 106 09/16/2020    CO2 24 09/16/2020    ANIONGAP 11 09/16/2020     (H) 09/16/2020    BUN 17 09/16/2020    CR 0.87 09/16/2020    GFRESTIMATED >60 09/16/2020    GFRESTBLACK >60 09/16/2020    REGLA 9.5 09/16/2020        A1C RESULTS:  No results found for: A1C    INR RESULTS:  Lab Results   Component Value Date    INR 0.98 12/06/2011           CC  No referring provider defined for this encounter.      Thank you for allowing me to participate in the care of your patient.      Sincerely,     Agustin Madden MD     [facility]    cc:   No referring provider defined for this encounter.

## 2021-01-06 NOTE — PROGRESS NOTES
HPI and Plan:   See dictation    No orders of the defined types were placed in this encounter.    No orders of the defined types were placed in this encounter.    There are no discontinued medications.      Encounter Diagnoses   Name Primary?     Heart block Yes     Morbid obesity (H)        CURRENT MEDICATIONS:  Current Outpatient Medications   Medication Sig Dispense Refill     Cholecalciferol (VITAMIN D) 2000 UNIT CAPS Take 1 tablet by mouth.       metoprolol (TOPROL-XL) 25 MG 24 hr tablet Take 25 mg by mouth At Bedtime.       omeprazole (PRILOSEC) 20 MG capsule Take 20 mg by mouth daily.       order for DME CPAP every night       rosuvastatin (CRESTOR) 20 MG tablet Take 10 mg by mouth daily.       sertraline (ZOLOFT) 50 MG tablet Take 50 mg by mouth At Bedtime.       cycloSPORINE (RESTASIS) 0.05 % ophthalmic emulsion 1 drop 2 times daily         ALLERGIES     Allergies   Allergen Reactions     Penicillins Rash       PAST MEDICAL HISTORY:  Past Medical History:   Diagnosis Date     Angina pectoris     normal cor artery-?syndrome X, ?Prinzmetal's     Anxiety      Arrhythmia     PVC     Costochondritis      Depression      GERD (gastroesophageal reflux disease)     normal EGD     Hyperlipidaemia      Hypertension      Impaired fasting glucose      Migraines      Pacemaker     Medtronic adapta  pacemaker serial [id number]     Second degree heart block 2003    Medtronic adapta  pacemaker serial [id number]     Shingles      Sleep apnea     CPAP       PAST SURGICAL HISTORY:  Past Surgical History:   Procedure Laterality Date     DILATION AND CURETTAGE  1/13/2012    Procedure:DILATION AND CURETTAGE; DILATION AND CURETTAGE ; Surgeon:NIKI QUEEN; Location: OR      PERM PACER DBLE LEAD       IMPLANT PACEMAKER  2003     LAPAROSCOPIC HYSTERECTOMY TOTAL  2/13/2012    Procedure:LAPAROSCOPIC HYSTERECTOMY TOTAL; LAPAROSCOPIC TOTAL HYSTERECTOMY , BILATERAL SALPINGO-OOPHERECTOMY; Surgeon:NIKI QUEEN; Location:  OR     TUBAL LIGATION         FAMILY HISTORY:  Family History   Problem Relation Age of Onset     Hypertension Mother      Hyperlipidemia Mother      Hypertension Father      Diabetes Father      Hyperlipidemia Father      Myocardial Infarction Father 84     Myocardial Infarction Maternal Grandfather      Myocardial Infarction Paternal Grandmother      Heart Disease Daughter         minor MVP       SOCIAL HISTORY:  Social History     Socioeconomic History     Marital status:      Spouse name: None     Number of children: None     Years of education: None     Highest education level: None   Occupational History     None   Social Needs     Financial resource strain: None     Food insecurity     Worry: None     Inability: None     Transportation needs     Medical: None     Non-medical: None   Tobacco Use     Smoking status: Former Smoker     Quit date: 2008     Years since quittin.0     Smokeless tobacco: Never Used   Substance and Sexual Activity     Alcohol use: Yes     Comment: 2 drinks a month     Drug use: No     Sexual activity: None   Lifestyle     Physical activity     Days per week: None     Minutes per session: None     Stress: None   Relationships     Social connections     Talks on phone: None     Gets together: None     Attends Mandaen service: None     Active member of club or organization: None     Attends meetings of clubs or organizations: None     Relationship status: None     Intimate partner violence     Fear of current or ex partner: None     Emotionally abused: None     Physically abused: None     Forced sexual activity: None   Other Topics Concern     Parent/sibling w/ CABG, MI or angioplasty before 65F 55M? Not Asked      Service Not Asked     Blood Transfusions Not Asked     Caffeine Concern No     Comment: 1-2 cups per day     Occupational Exposure Not Asked     Hobby Hazards Not Asked     Sleep Concern Yes     Comment: sleep apnea , cpap     Stress Concern Not Asked  "    Weight Concern No     Special Diet No     Back Care Not Asked     Exercise Yes     Comment: some walking 2-3 days a week     Bike Helmet Not Asked     Seat Belt Yes     Self-Exams Not Asked   Social History Narrative     None       Review of Systems:  Skin:  Negative     Eyes:  Negative    ENT:  Negative    Respiratory:  Negative    Cardiovascular:  Negative    Gastroenterology: Negative    Genitourinary:  Negative    Musculoskeletal:  Negative    Neurologic:  Negative    Psychiatric:  Negative    Heme/Lymph/Imm:  Negative    Endocrine:  Negative      Physical Exam:  Vitals: /75   Pulse 64   Ht 1.6 m (5' 3\")   Wt 112.9 kg (249 lb)   BMI 44.11 kg/m      Constitutional:  cooperative, alert and oriented, well developed, well nourished, in no acute distress        Skin:  warm and dry to the touch, no apparent skin lesions or masses noted   pacemaker incision in the left infraclavicular area was well-healed      Head:  normocephalic, no masses or lesions        Eyes:  pupils equal and round, conjunctivae and lids unremarkable, sclera white, no xanthalasma, EOMS intact, no nystagmus        Lymph:No Cervical lymphadenopathy present;No thyromegaly     ENT:  no pallor or cyanosis, dentition good        Neck:  carotid pulses are full and equal bilaterally, JVP normal, no carotid bruit        Respiratory:  normal breath sounds, clear to auscultation, normal A-P diameter, normal symmetry, normal respiratory excursion, no use of accessory muscles         Cardiac: regular rhythm, normal S1/S2, no S3 or S4, apical impulse not displaced, no murmurs, gallops or rubs                  radial-femoral delay                                      GI:  abdomen soft, non-tender, BS normoactive, no mass, no HSM, no bruits obese      Extremities and Muscular Skeletal:  no deformities, clubbing, cyanosis, erythema observed              Neurological:  no gross motor deficits        Psych:  Alert and Oriented x 3      Recent Lab " Results:  LIPID RESULTS:  Lab Results   Component Value Date    CHOL 213 (H) 09/16/2020    HDL 57 09/16/2020     09/16/2020    TRIG 162 (H) 09/16/2020    CHOLHDLRATIO 3.68 05/30/2014       LIVER ENZYME RESULTS:  Lab Results   Component Value Date    AST 16 04/01/2016    ALT 29 04/01/2016       CBC RESULTS:  Lab Results   Component Value Date    WBC 9.1 04/01/2016    RBC 4.48 04/01/2016    HGB 12.5 04/01/2016    HCT 38.7 04/01/2016    MCV 86 04/01/2016    MCH 27.9 04/01/2016    MCHC 32.3 04/01/2016    RDW 13.3 04/01/2016     04/01/2016       BMP RESULTS:  Lab Results   Component Value Date     09/16/2020    POTASSIUM 4.7 09/16/2020    CHLORIDE 106 09/16/2020    CO2 24 09/16/2020    ANIONGAP 11 09/16/2020     (H) 09/16/2020    BUN 17 09/16/2020    CR 0.87 09/16/2020    GFRESTIMATED >60 09/16/2020    GFRESTBLACK >60 09/16/2020    REGLA 9.5 09/16/2020        A1C RESULTS:  No results found for: A1C    INR RESULTS:  Lab Results   Component Value Date    INR 0.98 12/06/2011           CC  No referring provider defined for this encounter.

## 2021-01-14 ENCOUNTER — HEALTH MAINTENANCE LETTER (OUTPATIENT)
Age: 66
End: 2021-01-14

## 2021-04-16 ENCOUNTER — ANCILLARY PROCEDURE (OUTPATIENT)
Dept: CARDIOLOGY | Facility: CLINIC | Age: 66
End: 2021-04-16
Attending: INTERNAL MEDICINE
Payer: COMMERCIAL

## 2021-04-16 DIAGNOSIS — Z95.0 CARDIAC PACEMAKER IN SITU: ICD-10-CM

## 2021-04-16 PROCEDURE — 93294 REM INTERROG EVL PM/LDLS PM: CPT | Performed by: INTERNAL MEDICINE

## 2021-04-16 PROCEDURE — 93296 REM INTERROG EVL PM/IDS: CPT | Performed by: INTERNAL MEDICINE

## 2021-04-19 LAB
MDC_IDC_MSMT_BATTERY_DTM: NORMAL
MDC_IDC_MSMT_BATTERY_IMPEDANCE: 1141 OHM
MDC_IDC_MSMT_BATTERY_REMAINING_LONGEVITY: 71 MO
MDC_IDC_MSMT_BATTERY_STATUS: NORMAL
MDC_IDC_MSMT_BATTERY_VOLTAGE: 2.77 V
MDC_IDC_MSMT_LEADCHNL_RA_IMPEDANCE_VALUE: 478 OHM
MDC_IDC_MSMT_LEADCHNL_RA_PACING_THRESHOLD_AMPLITUDE: 0.38 V
MDC_IDC_MSMT_LEADCHNL_RA_PACING_THRESHOLD_PULSEWIDTH: 0.4 MS
MDC_IDC_MSMT_LEADCHNL_RV_IMPEDANCE_VALUE: 825 OHM
MDC_IDC_MSMT_LEADCHNL_RV_PACING_THRESHOLD_AMPLITUDE: 1 V
MDC_IDC_MSMT_LEADCHNL_RV_PACING_THRESHOLD_PULSEWIDTH: 0.4 MS
MDC_IDC_PG_IMPLANT_DTM: NORMAL
MDC_IDC_PG_MFG: NORMAL
MDC_IDC_PG_MODEL: NORMAL
MDC_IDC_PG_SERIAL: NORMAL
MDC_IDC_PG_TYPE: NORMAL
MDC_IDC_SESS_CLINIC_NAME: NORMAL
MDC_IDC_SESS_DTM: NORMAL
MDC_IDC_SESS_TYPE: NORMAL
MDC_IDC_SET_BRADY_AT_MODE_SWITCH_MODE: NORMAL
MDC_IDC_SET_BRADY_AT_MODE_SWITCH_RATE: 175 {BEATS}/MIN
MDC_IDC_SET_BRADY_LOWRATE: 60 {BEATS}/MIN
MDC_IDC_SET_BRADY_MAX_SENSOR_RATE: 150 {BEATS}/MIN
MDC_IDC_SET_BRADY_MAX_TRACKING_RATE: 150 {BEATS}/MIN
MDC_IDC_SET_BRADY_MODE: NORMAL
MDC_IDC_SET_BRADY_PAV_DELAY_LOW: 150 MS
MDC_IDC_SET_BRADY_SAV_DELAY_LOW: 120 MS
MDC_IDC_SET_LEADCHNL_RA_PACING_AMPLITUDE: 1.5 V
MDC_IDC_SET_LEADCHNL_RA_PACING_CAPTURE_MODE: NORMAL
MDC_IDC_SET_LEADCHNL_RA_PACING_POLARITY: NORMAL
MDC_IDC_SET_LEADCHNL_RA_PACING_PULSEWIDTH: 0.4 MS
MDC_IDC_SET_LEADCHNL_RA_SENSING_POLARITY: NORMAL
MDC_IDC_SET_LEADCHNL_RA_SENSING_SENSITIVITY: 0.5 MV
MDC_IDC_SET_LEADCHNL_RV_PACING_AMPLITUDE: 2 V
MDC_IDC_SET_LEADCHNL_RV_PACING_CAPTURE_MODE: NORMAL
MDC_IDC_SET_LEADCHNL_RV_PACING_POLARITY: NORMAL
MDC_IDC_SET_LEADCHNL_RV_PACING_PULSEWIDTH: 0.4 MS
MDC_IDC_SET_LEADCHNL_RV_SENSING_POLARITY: NORMAL
MDC_IDC_SET_LEADCHNL_RV_SENSING_SENSITIVITY: 2.8 MV
MDC_IDC_SET_ZONE_DETECTION_INTERVAL: 333.33 MS
MDC_IDC_SET_ZONE_DETECTION_INTERVAL: 342.86 MS
MDC_IDC_SET_ZONE_TYPE: NORMAL
MDC_IDC_SET_ZONE_TYPE: NORMAL
MDC_IDC_STAT_AT_BURDEN_PERCENT: 0 %
MDC_IDC_STAT_AT_DTM_END: NORMAL
MDC_IDC_STAT_AT_DTM_START: NORMAL
MDC_IDC_STAT_AT_MODE_SW_COUNT: 0
MDC_IDC_STAT_BRADY_AP_VP_PERCENT: 0 %
MDC_IDC_STAT_BRADY_AP_VS_PERCENT: 26 %
MDC_IDC_STAT_BRADY_AS_VP_PERCENT: 2 %
MDC_IDC_STAT_BRADY_AS_VS_PERCENT: 71 %
MDC_IDC_STAT_BRADY_DTM_END: NORMAL
MDC_IDC_STAT_BRADY_DTM_START: NORMAL
MDC_IDC_STAT_EPISODE_RECENT_COUNT: 0
MDC_IDC_STAT_EPISODE_RECENT_COUNT: 1
MDC_IDC_STAT_EPISODE_RECENT_COUNT_DTM_END: NORMAL
MDC_IDC_STAT_EPISODE_RECENT_COUNT_DTM_END: NORMAL
MDC_IDC_STAT_EPISODE_RECENT_COUNT_DTM_START: NORMAL
MDC_IDC_STAT_EPISODE_RECENT_COUNT_DTM_START: NORMAL
MDC_IDC_STAT_EPISODE_TYPE: NORMAL
MDC_IDC_STAT_EPISODE_TYPE: NORMAL

## 2021-07-21 ENCOUNTER — ANCILLARY PROCEDURE (OUTPATIENT)
Dept: CARDIOLOGY | Facility: CLINIC | Age: 66
End: 2021-07-21
Attending: INTERNAL MEDICINE
Payer: COMMERCIAL

## 2021-07-21 DIAGNOSIS — Z95.0 CARDIAC PACEMAKER IN SITU: ICD-10-CM

## 2021-07-21 PROCEDURE — 93280 PM DEVICE PROGR EVAL DUAL: CPT | Performed by: INTERNAL MEDICINE

## 2021-08-02 LAB
MDC_IDC_MSMT_BATTERY_DTM: NORMAL
MDC_IDC_MSMT_BATTERY_IMPEDANCE: 1303 OHM
MDC_IDC_MSMT_BATTERY_REMAINING_LONGEVITY: 65 MO
MDC_IDC_MSMT_BATTERY_STATUS: NORMAL
MDC_IDC_MSMT_BATTERY_VOLTAGE: 2.77 V
MDC_IDC_MSMT_LEADCHNL_RA_IMPEDANCE_VALUE: 433 OHM
MDC_IDC_MSMT_LEADCHNL_RA_PACING_THRESHOLD_AMPLITUDE: 0.38 V
MDC_IDC_MSMT_LEADCHNL_RA_PACING_THRESHOLD_AMPLITUDE: 0.5 V
MDC_IDC_MSMT_LEADCHNL_RA_PACING_THRESHOLD_PULSEWIDTH: 0.4 MS
MDC_IDC_MSMT_LEADCHNL_RA_PACING_THRESHOLD_PULSEWIDTH: 0.4 MS
MDC_IDC_MSMT_LEADCHNL_RA_SENSING_INTR_AMPL: 4 MV
MDC_IDC_MSMT_LEADCHNL_RV_IMPEDANCE_VALUE: 803 OHM
MDC_IDC_MSMT_LEADCHNL_RV_PACING_THRESHOLD_AMPLITUDE: 0.75 V
MDC_IDC_MSMT_LEADCHNL_RV_PACING_THRESHOLD_AMPLITUDE: 0.88 V
MDC_IDC_MSMT_LEADCHNL_RV_PACING_THRESHOLD_PULSEWIDTH: 0.4 MS
MDC_IDC_MSMT_LEADCHNL_RV_PACING_THRESHOLD_PULSEWIDTH: 0.4 MS
MDC_IDC_MSMT_LEADCHNL_RV_SENSING_INTR_AMPL: 8 MV
MDC_IDC_PG_IMPLANT_DTM: NORMAL
MDC_IDC_PG_MFG: NORMAL
MDC_IDC_PG_MODEL: NORMAL
MDC_IDC_PG_SERIAL: NORMAL
MDC_IDC_PG_TYPE: NORMAL
MDC_IDC_SESS_CLINIC_NAME: NORMAL
MDC_IDC_SESS_DTM: NORMAL
MDC_IDC_SESS_TYPE: NORMAL
MDC_IDC_SET_BRADY_AT_MODE_SWITCH_MODE: NORMAL
MDC_IDC_SET_BRADY_AT_MODE_SWITCH_RATE: 175 {BEATS}/MIN
MDC_IDC_SET_BRADY_LOWRATE: 60 {BEATS}/MIN
MDC_IDC_SET_BRADY_MAX_SENSOR_RATE: 150 {BEATS}/MIN
MDC_IDC_SET_BRADY_MAX_TRACKING_RATE: 150 {BEATS}/MIN
MDC_IDC_SET_BRADY_MODE: NORMAL
MDC_IDC_SET_BRADY_PAV_DELAY_LOW: 150 MS
MDC_IDC_SET_BRADY_SAV_DELAY_LOW: 120 MS
MDC_IDC_SET_LEADCHNL_RA_PACING_AMPLITUDE: 1.5 V
MDC_IDC_SET_LEADCHNL_RA_PACING_CAPTURE_MODE: NORMAL
MDC_IDC_SET_LEADCHNL_RA_PACING_POLARITY: NORMAL
MDC_IDC_SET_LEADCHNL_RA_PACING_PULSEWIDTH: 0.4 MS
MDC_IDC_SET_LEADCHNL_RA_SENSING_POLARITY: NORMAL
MDC_IDC_SET_LEADCHNL_RA_SENSING_SENSITIVITY: 0.5 MV
MDC_IDC_SET_LEADCHNL_RV_PACING_AMPLITUDE: 2 V
MDC_IDC_SET_LEADCHNL_RV_PACING_CAPTURE_MODE: NORMAL
MDC_IDC_SET_LEADCHNL_RV_PACING_POLARITY: NORMAL
MDC_IDC_SET_LEADCHNL_RV_PACING_PULSEWIDTH: 0.4 MS
MDC_IDC_SET_LEADCHNL_RV_SENSING_POLARITY: NORMAL
MDC_IDC_SET_LEADCHNL_RV_SENSING_SENSITIVITY: 2 MV
MDC_IDC_SET_ZONE_DETECTION_INTERVAL: 333.33 MS
MDC_IDC_SET_ZONE_DETECTION_INTERVAL: 342.86 MS
MDC_IDC_SET_ZONE_TYPE: NORMAL
MDC_IDC_SET_ZONE_TYPE: NORMAL
MDC_IDC_STAT_AT_BURDEN_PERCENT: 0 %
MDC_IDC_STAT_AT_DTM_END: NORMAL
MDC_IDC_STAT_AT_DTM_START: NORMAL
MDC_IDC_STAT_AT_MODE_SW_COUNT: 0
MDC_IDC_STAT_BRADY_AP_VP_PERCENT: 0 %
MDC_IDC_STAT_BRADY_AP_VS_PERCENT: 26 %
MDC_IDC_STAT_BRADY_AS_VP_PERCENT: 2 %
MDC_IDC_STAT_BRADY_AS_VS_PERCENT: 71 %
MDC_IDC_STAT_BRADY_DTM_END: NORMAL
MDC_IDC_STAT_BRADY_DTM_START: NORMAL
MDC_IDC_STAT_EPISODE_RECENT_COUNT: 0
MDC_IDC_STAT_EPISODE_RECENT_COUNT: 2
MDC_IDC_STAT_EPISODE_RECENT_COUNT_DTM_END: NORMAL
MDC_IDC_STAT_EPISODE_RECENT_COUNT_DTM_END: NORMAL
MDC_IDC_STAT_EPISODE_RECENT_COUNT_DTM_START: NORMAL
MDC_IDC_STAT_EPISODE_RECENT_COUNT_DTM_START: NORMAL
MDC_IDC_STAT_EPISODE_TYPE: NORMAL
MDC_IDC_STAT_EPISODE_TYPE: NORMAL

## 2021-09-17 ENCOUNTER — HOSPITAL ENCOUNTER (OUTPATIENT)
Dept: MAMMOGRAPHY | Facility: CLINIC | Age: 66
Discharge: HOME | End: 2021-09-17
Attending: FAMILY MEDICINE | Admitting: FAMILY MEDICINE
Payer: COMMERCIAL

## 2021-09-17 DIAGNOSIS — Z12.31 VISIT FOR SCREENING MAMMOGRAM: ICD-10-CM

## 2021-09-17 PROCEDURE — 77063 BREAST TOMOSYNTHESIS BI: CPT

## 2021-09-29 ENCOUNTER — HOSPITAL ENCOUNTER (OUTPATIENT)
Dept: ULTRASOUND IMAGING | Facility: CLINIC | Age: 66
Discharge: HOME | End: 2021-09-29
Attending: FAMILY MEDICINE | Admitting: FAMILY MEDICINE
Payer: COMMERCIAL

## 2021-09-29 DIAGNOSIS — R92.8 ABNORMAL MAMMOGRAM OF RIGHT BREAST: ICD-10-CM

## 2021-09-29 PROCEDURE — 76642 ULTRASOUND BREAST LIMITED: CPT | Mod: RT

## 2021-10-20 ENCOUNTER — ANCILLARY PROCEDURE (OUTPATIENT)
Dept: CARDIOLOGY | Facility: CLINIC | Age: 66
End: 2021-10-20
Attending: INTERNAL MEDICINE
Payer: COMMERCIAL

## 2021-10-20 DIAGNOSIS — Z95.0 CARDIAC PACEMAKER IN SITU: ICD-10-CM

## 2021-10-20 PROCEDURE — 93296 REM INTERROG EVL PM/IDS: CPT | Performed by: INTERNAL MEDICINE

## 2021-10-20 PROCEDURE — 93294 REM INTERROG EVL PM/LDLS PM: CPT | Performed by: INTERNAL MEDICINE

## 2021-10-25 LAB
MDC_IDC_MSMT_BATTERY_DTM: NORMAL
MDC_IDC_MSMT_BATTERY_IMPEDANCE: 1415 OHM
MDC_IDC_MSMT_BATTERY_REMAINING_LONGEVITY: 61 MO
MDC_IDC_MSMT_BATTERY_STATUS: NORMAL
MDC_IDC_MSMT_BATTERY_VOLTAGE: 2.77 V
MDC_IDC_MSMT_LEADCHNL_RA_IMPEDANCE_VALUE: 446 OHM
MDC_IDC_MSMT_LEADCHNL_RA_PACING_THRESHOLD_AMPLITUDE: 0.38 V
MDC_IDC_MSMT_LEADCHNL_RA_PACING_THRESHOLD_PULSEWIDTH: 0.4 MS
MDC_IDC_MSMT_LEADCHNL_RV_IMPEDANCE_VALUE: 837 OHM
MDC_IDC_MSMT_LEADCHNL_RV_PACING_THRESHOLD_AMPLITUDE: 0.88 V
MDC_IDC_MSMT_LEADCHNL_RV_PACING_THRESHOLD_PULSEWIDTH: 0.4 MS
MDC_IDC_PG_IMPLANT_DTM: NORMAL
MDC_IDC_PG_MFG: NORMAL
MDC_IDC_PG_MODEL: NORMAL
MDC_IDC_PG_SERIAL: NORMAL
MDC_IDC_PG_TYPE: NORMAL
MDC_IDC_SESS_CLINIC_NAME: NORMAL
MDC_IDC_SESS_DTM: NORMAL
MDC_IDC_SESS_TYPE: NORMAL
MDC_IDC_SET_BRADY_AT_MODE_SWITCH_MODE: NORMAL
MDC_IDC_SET_BRADY_AT_MODE_SWITCH_RATE: 175 {BEATS}/MIN
MDC_IDC_SET_BRADY_LOWRATE: 60 {BEATS}/MIN
MDC_IDC_SET_BRADY_MAX_SENSOR_RATE: 150 {BEATS}/MIN
MDC_IDC_SET_BRADY_MAX_TRACKING_RATE: 150 {BEATS}/MIN
MDC_IDC_SET_BRADY_MODE: NORMAL
MDC_IDC_SET_BRADY_PAV_DELAY_LOW: 150 MS
MDC_IDC_SET_BRADY_SAV_DELAY_LOW: 120 MS
MDC_IDC_SET_LEADCHNL_RA_PACING_AMPLITUDE: 1.5 V
MDC_IDC_SET_LEADCHNL_RA_PACING_CAPTURE_MODE: NORMAL
MDC_IDC_SET_LEADCHNL_RA_PACING_POLARITY: NORMAL
MDC_IDC_SET_LEADCHNL_RA_PACING_PULSEWIDTH: 0.4 MS
MDC_IDC_SET_LEADCHNL_RA_SENSING_POLARITY: NORMAL
MDC_IDC_SET_LEADCHNL_RA_SENSING_SENSITIVITY: 0.5 MV
MDC_IDC_SET_LEADCHNL_RV_PACING_AMPLITUDE: 2 V
MDC_IDC_SET_LEADCHNL_RV_PACING_CAPTURE_MODE: NORMAL
MDC_IDC_SET_LEADCHNL_RV_PACING_POLARITY: NORMAL
MDC_IDC_SET_LEADCHNL_RV_PACING_PULSEWIDTH: 0.4 MS
MDC_IDC_SET_LEADCHNL_RV_SENSING_POLARITY: NORMAL
MDC_IDC_SET_LEADCHNL_RV_SENSING_SENSITIVITY: 2.8 MV
MDC_IDC_SET_ZONE_DETECTION_INTERVAL: 333.33 MS
MDC_IDC_SET_ZONE_DETECTION_INTERVAL: 342.86 MS
MDC_IDC_SET_ZONE_TYPE: NORMAL
MDC_IDC_SET_ZONE_TYPE: NORMAL
MDC_IDC_STAT_AT_BURDEN_PERCENT: 0 %
MDC_IDC_STAT_AT_DTM_END: NORMAL
MDC_IDC_STAT_AT_DTM_START: NORMAL
MDC_IDC_STAT_AT_MODE_SW_COUNT: 0
MDC_IDC_STAT_BRADY_AP_VP_PERCENT: 0 %
MDC_IDC_STAT_BRADY_AP_VS_PERCENT: 26 %
MDC_IDC_STAT_BRADY_AS_VP_PERCENT: 0 %
MDC_IDC_STAT_BRADY_AS_VS_PERCENT: 74 %
MDC_IDC_STAT_BRADY_DTM_END: NORMAL
MDC_IDC_STAT_BRADY_DTM_START: NORMAL
MDC_IDC_STAT_EPISODE_RECENT_COUNT: 0
MDC_IDC_STAT_EPISODE_RECENT_COUNT: 0
MDC_IDC_STAT_EPISODE_RECENT_COUNT_DTM_END: NORMAL
MDC_IDC_STAT_EPISODE_RECENT_COUNT_DTM_END: NORMAL
MDC_IDC_STAT_EPISODE_RECENT_COUNT_DTM_START: NORMAL
MDC_IDC_STAT_EPISODE_RECENT_COUNT_DTM_START: NORMAL
MDC_IDC_STAT_EPISODE_TYPE: NORMAL
MDC_IDC_STAT_EPISODE_TYPE: NORMAL

## 2022-01-24 ENCOUNTER — ANCILLARY PROCEDURE (OUTPATIENT)
Dept: CARDIOLOGY | Facility: CLINIC | Age: 67
End: 2022-01-24
Attending: INTERNAL MEDICINE
Payer: COMMERCIAL

## 2022-01-24 DIAGNOSIS — Z95.0 CARDIAC PACEMAKER IN SITU: ICD-10-CM

## 2022-01-24 PROCEDURE — 93296 REM INTERROG EVL PM/IDS: CPT | Performed by: INTERNAL MEDICINE

## 2022-01-24 PROCEDURE — 93294 REM INTERROG EVL PM/LDLS PM: CPT | Performed by: INTERNAL MEDICINE

## 2022-01-31 LAB
MDC_IDC_MSMT_BATTERY_DTM: NORMAL
MDC_IDC_MSMT_BATTERY_IMPEDANCE: 1497 OHM
MDC_IDC_MSMT_BATTERY_REMAINING_LONGEVITY: 58 MO
MDC_IDC_MSMT_BATTERY_STATUS: NORMAL
MDC_IDC_MSMT_BATTERY_VOLTAGE: 2.76 V
MDC_IDC_MSMT_LEADCHNL_RA_IMPEDANCE_VALUE: 493 OHM
MDC_IDC_MSMT_LEADCHNL_RA_PACING_THRESHOLD_AMPLITUDE: 0.38 V
MDC_IDC_MSMT_LEADCHNL_RA_PACING_THRESHOLD_PULSEWIDTH: 0.4 MS
MDC_IDC_MSMT_LEADCHNL_RV_IMPEDANCE_VALUE: 899 OHM
MDC_IDC_MSMT_LEADCHNL_RV_PACING_THRESHOLD_AMPLITUDE: 1.12 V
MDC_IDC_MSMT_LEADCHNL_RV_PACING_THRESHOLD_PULSEWIDTH: 0.4 MS
MDC_IDC_PG_IMPLANT_DTM: NORMAL
MDC_IDC_PG_MFG: NORMAL
MDC_IDC_PG_MODEL: NORMAL
MDC_IDC_PG_SERIAL: NORMAL
MDC_IDC_PG_TYPE: NORMAL
MDC_IDC_SESS_CLINIC_NAME: NORMAL
MDC_IDC_SESS_DTM: NORMAL
MDC_IDC_SESS_TYPE: NORMAL
MDC_IDC_SET_BRADY_AT_MODE_SWITCH_MODE: NORMAL
MDC_IDC_SET_BRADY_AT_MODE_SWITCH_RATE: 175 {BEATS}/MIN
MDC_IDC_SET_BRADY_LOWRATE: 60 {BEATS}/MIN
MDC_IDC_SET_BRADY_MAX_SENSOR_RATE: 150 {BEATS}/MIN
MDC_IDC_SET_BRADY_MAX_TRACKING_RATE: 150 {BEATS}/MIN
MDC_IDC_SET_BRADY_MODE: NORMAL
MDC_IDC_SET_BRADY_PAV_DELAY_LOW: 150 MS
MDC_IDC_SET_BRADY_SAV_DELAY_LOW: 120 MS
MDC_IDC_SET_LEADCHNL_RA_PACING_AMPLITUDE: 1.5 V
MDC_IDC_SET_LEADCHNL_RA_PACING_CAPTURE_MODE: NORMAL
MDC_IDC_SET_LEADCHNL_RA_PACING_POLARITY: NORMAL
MDC_IDC_SET_LEADCHNL_RA_PACING_PULSEWIDTH: 0.4 MS
MDC_IDC_SET_LEADCHNL_RA_SENSING_POLARITY: NORMAL
MDC_IDC_SET_LEADCHNL_RA_SENSING_SENSITIVITY: 0.5 MV
MDC_IDC_SET_LEADCHNL_RV_PACING_AMPLITUDE: 2.25 V
MDC_IDC_SET_LEADCHNL_RV_PACING_CAPTURE_MODE: NORMAL
MDC_IDC_SET_LEADCHNL_RV_PACING_POLARITY: NORMAL
MDC_IDC_SET_LEADCHNL_RV_PACING_PULSEWIDTH: 0.4 MS
MDC_IDC_SET_LEADCHNL_RV_SENSING_POLARITY: NORMAL
MDC_IDC_SET_LEADCHNL_RV_SENSING_SENSITIVITY: 2.8 MV
MDC_IDC_SET_ZONE_DETECTION_INTERVAL: 333.33 MS
MDC_IDC_SET_ZONE_DETECTION_INTERVAL: 342.86 MS
MDC_IDC_SET_ZONE_TYPE: NORMAL
MDC_IDC_SET_ZONE_TYPE: NORMAL
MDC_IDC_STAT_AT_BURDEN_PERCENT: 0 %
MDC_IDC_STAT_AT_DTM_END: NORMAL
MDC_IDC_STAT_AT_DTM_START: NORMAL
MDC_IDC_STAT_AT_MODE_SW_COUNT: 0
MDC_IDC_STAT_BRADY_AP_VP_PERCENT: 0 %
MDC_IDC_STAT_BRADY_AP_VS_PERCENT: 24 %
MDC_IDC_STAT_BRADY_AS_VP_PERCENT: 0 %
MDC_IDC_STAT_BRADY_AS_VS_PERCENT: 76 %
MDC_IDC_STAT_BRADY_DTM_END: NORMAL
MDC_IDC_STAT_BRADY_DTM_START: NORMAL
MDC_IDC_STAT_EPISODE_RECENT_COUNT: 0
MDC_IDC_STAT_EPISODE_RECENT_COUNT: 0
MDC_IDC_STAT_EPISODE_RECENT_COUNT_DTM_END: NORMAL
MDC_IDC_STAT_EPISODE_RECENT_COUNT_DTM_END: NORMAL
MDC_IDC_STAT_EPISODE_RECENT_COUNT_DTM_START: NORMAL
MDC_IDC_STAT_EPISODE_RECENT_COUNT_DTM_START: NORMAL
MDC_IDC_STAT_EPISODE_TYPE: NORMAL
MDC_IDC_STAT_EPISODE_TYPE: NORMAL

## 2022-02-12 ENCOUNTER — HOSPITAL ENCOUNTER (EMERGENCY)
Facility: CLINIC | Age: 67
Discharge: HOME | End: 2022-02-13
Attending: EMERGENCY MEDICINE | Admitting: EMERGENCY MEDICINE
Payer: COMMERCIAL

## 2022-02-12 ENCOUNTER — HEALTH MAINTENANCE LETTER (OUTPATIENT)
Age: 67
End: 2022-02-12

## 2022-02-12 DIAGNOSIS — N39.0 ACUTE UTI: ICD-10-CM

## 2022-02-12 PROCEDURE — 87086 URINE CULTURE/COLONY COUNT: CPT | Performed by: EMERGENCY MEDICINE

## 2022-02-12 PROCEDURE — 81001 URINALYSIS AUTO W/SCOPE: CPT | Performed by: EMERGENCY MEDICINE

## 2022-02-12 PROCEDURE — 99283 EMERGENCY DEPT VISIT LOW MDM: CPT

## 2022-02-12 ASSESSMENT — ENCOUNTER SYMPTOMS
FEVER: 0
DYSURIA: 1
BACK PAIN: 0
VOMITING: 0
FREQUENCY: 1
HEMATURIA: 1

## 2022-02-13 VITALS
SYSTOLIC BLOOD PRESSURE: 138 MMHG | RESPIRATION RATE: 20 BRPM | OXYGEN SATURATION: 98 % | DIASTOLIC BLOOD PRESSURE: 85 MMHG | HEART RATE: 82 BPM | TEMPERATURE: 97.7 F

## 2022-02-13 LAB
ALBUMIN UR-MCNC: 30 MG/DL
APPEARANCE UR: ABNORMAL
BACTERIA #/AREA URNS HPF: ABNORMAL /HPF
BILIRUB UR QL STRIP: NEGATIVE
COLOR UR AUTO: YELLOW
GLUCOSE UR STRIP-MCNC: NEGATIVE MG/DL
HGB UR QL STRIP: ABNORMAL
KETONES UR STRIP-MCNC: NEGATIVE MG/DL
LEUKOCYTE ESTERASE UR QL STRIP: ABNORMAL
NITRATE UR QL: NEGATIVE
PH UR STRIP: 5.5 [PH] (ref 5–7)
RBC URINE: >182 /HPF
SP GR UR STRIP: 1.02 (ref 1–1.03)
SQUAMOUS EPITHELIAL: 3 /HPF
UROBILINOGEN UR STRIP-MCNC: NORMAL MG/DL
WBC CLUMPS #/AREA URNS HPF: PRESENT /HPF
WBC URINE: >182 /HPF

## 2022-02-13 PROCEDURE — 250N000013 HC RX MED GY IP 250 OP 250 PS 637: Performed by: EMERGENCY MEDICINE

## 2022-02-13 RX ORDER — CEPHALEXIN 500 MG/1
500 CAPSULE ORAL 2 TIMES DAILY
Qty: 14 CAPSULE | Refills: 0 | Status: SHIPPED | OUTPATIENT
Start: 2022-02-13 | End: 2022-02-20

## 2022-02-13 RX ORDER — CEPHALEXIN 500 MG/1
500 CAPSULE ORAL ONCE
Status: COMPLETED | OUTPATIENT
Start: 2022-02-13 | End: 2022-02-13

## 2022-02-13 RX ADMIN — CEPHALEXIN 500 MG: 500 CAPSULE ORAL at 00:53

## 2022-02-13 NOTE — ED PROVIDER NOTES
History   Chief Complaint:  Dysuria    The history is provided by the patient.      Emmy Sawyer is a 66 year old female with history of hypertension who presents with UTI symptoms. She started getting urgency and dysuria starting at 1800 with blood in urine following shortly after. She denies vomiting, fever and no major back pain. She gets a UTI about 3 times a year and states that her antibiotics that she gets works fine for her.      Review of Systems   Constitutional: Negative for fever.   Gastrointestinal: Negative for vomiting.   Genitourinary: Positive for dysuria, frequency and hematuria.   Musculoskeletal: Negative for back pain.   All other systems reviewed and are negative.        Allergies:  Pcn [Penicillins]    Medications:  metoprolol   omeprazole  rosuvastatin   sertraline    Past Medical History:     GERD  Hyperlipidemia   Hypertension  Migraines  Sleep apneas  Shingles   Pacemaker  Morbid obesity      Past Surgical History:    DILATION AND CURETTAGE  LAPAROSCOPIC HYSTERECTOMY TOTAL  TUBAL LIGATION     Family History:    Hypertension  Hyperlipidemia  Diabetes  Myocardial infarction  Heart disease    Social History:  Presents alone  PCP: Hayley Hays    Physical Exam     Patient Vitals for the past 24 hrs:   BP Temp Temp src Pulse Resp SpO2   02/13/22 0055 138/85 -- -- 82 -- 98 %   02/12/22 2322 (!) 177/101 97.7  F (36.5  C) Oral 83 20 97 %       Physical Exam  Constitutional:  Oriented to person, place, and time. Well appearing.  HENT:   Head:    Normocephalic.   Mouth/Throat:   Oropharynx is clear and moist.   Eyes:    EOM are normal. Pupils are equal, round, and reactive to light.   Neck:    Neck supple.   Cardiovascular:  Normal rate, regular rhythm and normal heart sounds.      Exam reveals no gallop and no friction rub.       No murmur heard.  Pulmonary/Chest:  Effort normal and breath sounds normal.      No respiratory distress. No wheezes. No rales.      No reproducible chest  wall pain.  Abdominal:   Soft. No distension. No tenderness. No rebound and no guarding. To pain of percussion of flanks.   Musculoskeletal:  Normal range of motion.   Neurological:   Alert and oriented to person, place, and time.           Moves all 4 extremities spontaneously    Skin:    No rash noted. No pallor.     Emergency Department Course     Laboratory:  Labs Ordered and Resulted from Time of ED Arrival to Time of ED Departure   ROUTINE UA WITH MICROSCOPIC REFLEX TO CULTURE - Abnormal       Result Value    Color Urine Yellow      Appearance Urine Slightly Cloudy (*)     Glucose Urine Negative      Bilirubin Urine Negative      Ketones Urine Negative      Specific Gravity Urine 1.024      Blood Urine Large (*)     pH Urine 5.5      Protein Albumin Urine 30  (*)     Urobilinogen Urine Normal      Nitrite Urine Negative      Leukocyte Esterase Urine Moderate (*)     Bacteria Urine Few (*)     WBC Clumps Urine Present (*)     RBC Urine >182 (*)     WBC Urine >182 (*)     Squamous Epithelials Urine 3 (*)    URINE CULTURE      Emergency Department Course:    Reviewed:  I reviewed nursing notes, vitals, past medical history and Care Everywhere    Assessments:  2333 I obtained history and examined the patient as noted above.   0040    Patient rechecked and updated.     Interventions:  0053    Keflex, 500 mg, PO    Disposition:  The patient was discharged to home.     Impression & Plan     Medical Decision Making:  Emmy Sawyer is a 66 year old female who presents for evaluation of dysuria.  This clinically is consistent with a urinary tract infection.  Urinalysis confirms the infection.  There has been no fever, back/flank pain or significant abdominal pain.  There is no clinical evidence of pyelonephritis, appendicitis, colitis, diverticulitis or any intraabdominal catastrophe. The patient will be started on antibiotics for the infection. Return if increasing pain, vomiting, fever, or inability to tolerate  the oral antibiotic.  Follow up with primary physician is indicated if not improving in 2-3 days.          Diagnosis:    ICD-10-CM    1. Acute UTI  N39.0        Discharge Medications:  Discharge Medication List as of 2/13/2022 12:54 AM      START taking these medications    Details   cephALEXin (KEFLEX) 500 MG capsule Take 1 capsule (500 mg) by mouth 2 times daily for 7 days, Disp-14 capsule, R-0, E-Prescribe             Scribe Disclosure:  I, Andre Mckeon, am serving as a scribe at 11:33 PM on 2/12/2022 to document services personally performed by Obinna Garcia MD based on my observations and the provider's statements to me.              Obinna Garcia MD  02/13/22 0358

## 2022-02-13 NOTE — ED NOTES
C/o onset of dysuria at 1900 tonight.  Reports history of similar symptoms that have generally progressed quickly.

## 2022-02-15 LAB
BACTERIA UR CULT: ABNORMAL
BACTERIA UR CULT: ABNORMAL

## 2022-04-25 ENCOUNTER — ANCILLARY PROCEDURE (OUTPATIENT)
Dept: CARDIOLOGY | Facility: CLINIC | Age: 67
End: 2022-04-25
Attending: INTERNAL MEDICINE
Payer: COMMERCIAL

## 2022-04-25 DIAGNOSIS — Z95.0 CARDIAC PACEMAKER IN SITU: ICD-10-CM

## 2022-04-25 PROCEDURE — 93296 REM INTERROG EVL PM/IDS: CPT | Performed by: INTERNAL MEDICINE

## 2022-04-25 PROCEDURE — 93294 REM INTERROG EVL PM/LDLS PM: CPT | Performed by: INTERNAL MEDICINE

## 2022-04-26 LAB
MDC_IDC_MSMT_BATTERY_DTM: NORMAL
MDC_IDC_MSMT_BATTERY_IMPEDANCE: 1608 OHM
MDC_IDC_MSMT_BATTERY_REMAINING_LONGEVITY: 55 MO
MDC_IDC_MSMT_BATTERY_STATUS: NORMAL
MDC_IDC_MSMT_BATTERY_VOLTAGE: 2.76 V
MDC_IDC_MSMT_LEADCHNL_RA_IMPEDANCE_VALUE: 479 OHM
MDC_IDC_MSMT_LEADCHNL_RA_PACING_THRESHOLD_AMPLITUDE: 0.38 V
MDC_IDC_MSMT_LEADCHNL_RA_PACING_THRESHOLD_PULSEWIDTH: 0.4 MS
MDC_IDC_MSMT_LEADCHNL_RV_IMPEDANCE_VALUE: 856 OHM
MDC_IDC_MSMT_LEADCHNL_RV_PACING_THRESHOLD_AMPLITUDE: 1 V
MDC_IDC_MSMT_LEADCHNL_RV_PACING_THRESHOLD_PULSEWIDTH: 0.4 MS
MDC_IDC_PG_IMPLANT_DTM: NORMAL
MDC_IDC_PG_MFG: NORMAL
MDC_IDC_PG_MODEL: NORMAL
MDC_IDC_PG_SERIAL: NORMAL
MDC_IDC_PG_TYPE: NORMAL
MDC_IDC_SESS_CLINIC_NAME: NORMAL
MDC_IDC_SESS_DTM: NORMAL
MDC_IDC_SESS_TYPE: NORMAL
MDC_IDC_SET_BRADY_AT_MODE_SWITCH_MODE: NORMAL
MDC_IDC_SET_BRADY_AT_MODE_SWITCH_RATE: 175 {BEATS}/MIN
MDC_IDC_SET_BRADY_LOWRATE: 60 {BEATS}/MIN
MDC_IDC_SET_BRADY_MAX_SENSOR_RATE: 150 {BEATS}/MIN
MDC_IDC_SET_BRADY_MAX_TRACKING_RATE: 150 {BEATS}/MIN
MDC_IDC_SET_BRADY_MODE: NORMAL
MDC_IDC_SET_BRADY_PAV_DELAY_LOW: 150 MS
MDC_IDC_SET_BRADY_SAV_DELAY_LOW: 120 MS
MDC_IDC_SET_LEADCHNL_RA_PACING_AMPLITUDE: 1.5 V
MDC_IDC_SET_LEADCHNL_RA_PACING_CAPTURE_MODE: NORMAL
MDC_IDC_SET_LEADCHNL_RA_PACING_POLARITY: NORMAL
MDC_IDC_SET_LEADCHNL_RA_PACING_PULSEWIDTH: 0.4 MS
MDC_IDC_SET_LEADCHNL_RA_SENSING_POLARITY: NORMAL
MDC_IDC_SET_LEADCHNL_RA_SENSING_SENSITIVITY: 0.5 MV
MDC_IDC_SET_LEADCHNL_RV_PACING_AMPLITUDE: 2 V
MDC_IDC_SET_LEADCHNL_RV_PACING_CAPTURE_MODE: NORMAL
MDC_IDC_SET_LEADCHNL_RV_PACING_POLARITY: NORMAL
MDC_IDC_SET_LEADCHNL_RV_PACING_PULSEWIDTH: 0.4 MS
MDC_IDC_SET_LEADCHNL_RV_SENSING_POLARITY: NORMAL
MDC_IDC_SET_LEADCHNL_RV_SENSING_SENSITIVITY: 2.8 MV
MDC_IDC_SET_ZONE_DETECTION_INTERVAL: 333.33 MS
MDC_IDC_SET_ZONE_DETECTION_INTERVAL: 342.86 MS
MDC_IDC_SET_ZONE_TYPE: NORMAL
MDC_IDC_SET_ZONE_TYPE: NORMAL
MDC_IDC_STAT_AT_BURDEN_PERCENT: 0 %
MDC_IDC_STAT_AT_DTM_END: NORMAL
MDC_IDC_STAT_AT_DTM_START: NORMAL
MDC_IDC_STAT_AT_MODE_SW_COUNT: 0
MDC_IDC_STAT_BRADY_AP_VP_PERCENT: 0 %
MDC_IDC_STAT_BRADY_AP_VS_PERCENT: 24 %
MDC_IDC_STAT_BRADY_AS_VP_PERCENT: 0 %
MDC_IDC_STAT_BRADY_AS_VS_PERCENT: 75 %
MDC_IDC_STAT_BRADY_DTM_END: NORMAL
MDC_IDC_STAT_BRADY_DTM_START: NORMAL
MDC_IDC_STAT_EPISODE_RECENT_COUNT: 0
MDC_IDC_STAT_EPISODE_RECENT_COUNT: 0
MDC_IDC_STAT_EPISODE_RECENT_COUNT_DTM_END: NORMAL
MDC_IDC_STAT_EPISODE_RECENT_COUNT_DTM_END: NORMAL
MDC_IDC_STAT_EPISODE_RECENT_COUNT_DTM_START: NORMAL
MDC_IDC_STAT_EPISODE_RECENT_COUNT_DTM_START: NORMAL
MDC_IDC_STAT_EPISODE_TYPE: NORMAL
MDC_IDC_STAT_EPISODE_TYPE: NORMAL

## 2022-05-18 ENCOUNTER — OFFICE VISIT (OUTPATIENT)
Dept: CARDIOLOGY | Facility: CLINIC | Age: 67
End: 2022-05-18
Payer: COMMERCIAL

## 2022-05-18 VITALS
WEIGHT: 247.7 LBS | BODY MASS INDEX: 43.89 KG/M2 | HEART RATE: 66 BPM | OXYGEN SATURATION: 97 % | DIASTOLIC BLOOD PRESSURE: 80 MMHG | SYSTOLIC BLOOD PRESSURE: 128 MMHG | HEIGHT: 63 IN

## 2022-05-18 DIAGNOSIS — I45.9 HEART BLOCK: ICD-10-CM

## 2022-05-18 DIAGNOSIS — E66.01 MORBID OBESITY (H): ICD-10-CM

## 2022-05-18 PROCEDURE — 99213 OFFICE O/P EST LOW 20 MIN: CPT | Performed by: INTERNAL MEDICINE

## 2022-05-18 RX ORDER — ROSUVASTATIN CALCIUM 20 MG/1
20 TABLET, COATED ORAL DAILY
Qty: 90 TABLET | Refills: 3 | COMMUNITY
Start: 2022-05-18

## 2022-05-18 NOTE — LETTER
5/18/2022    Hayley Hays MD  [facility] [address] [address] [facility] [address]    RE: Emmy Sawyer       Dear Colleague,     I had the pleasure of seeing Emmy Sawyer in the [facility].  HPI and Plan:   See dictation    No orders of the defined types were placed in this encounter.    Orders Placed This Encounter   Medications     rosuvastatin (CRESTOR) 20 MG tablet     Sig: Take 1 tablet (20 mg) by mouth daily     Dispense:  90 tablet     Refill:  3     Medications Discontinued During This Encounter   Medication Reason     rosuvastatin (CRESTOR) 20 MG tablet Reorder         Encounter Diagnoses   Name Primary?     Heart block      Morbid obesity (H)        CURRENT MEDICATIONS:  Current Outpatient Medications   Medication Sig Dispense Refill     Cholecalciferol (VITAMIN D) 2000 UNIT CAPS Take 1 tablet by mouth.       metoprolol (TOPROL-XL) 25 MG 24 hr tablet Take 25 mg by mouth At Bedtime.       omeprazole (PRILOSEC) 20 MG capsule Take 20 mg by mouth daily.       order for DME CPAP every night       rosuvastatin (CRESTOR) 20 MG tablet Take 1 tablet (20 mg) by mouth daily 90 tablet 3     sertraline (ZOLOFT) 50 MG tablet Take 50 mg by mouth At Bedtime.       cycloSPORINE (RESTASIS) 0.05 % ophthalmic emulsion 1 drop 2 times daily         ALLERGIES     Allergies   Allergen Reactions     Pcn [Penicillins] Rash       PAST MEDICAL HISTORY:  Past Medical History:   Diagnosis Date     Angina pectoris     normal cor artery-?syndrome X, ?Prinzmetal's     Anxiety      Arrhythmia     PVC     Costochondritis      Depression      GERD (gastroesophageal reflux disease)     normal EGD     Hyperlipidaemia      Hypertension      Impaired fasting glucose      Migraines      Pacemaker     Medtronic manny mclaughlin pacemaker serial [id number]     Second degree heart block 2003    Medtronic manny mclaughlin pacemaker serial [id number]     Shingles      Sleep apnea     CPAP       PAST SURGICAL  HISTORY:  Past Surgical History:   Procedure Laterality Date     DILATION AND CURETTAGE  2012    Procedure:DILATION AND CURETTAGE; DILATION AND CURETTAGE ; Surgeon:NIKI QUEEN; Location:[facility] OR     H PERM PACER DBLE LEAD       IMPLANT PACEMAKER       LAPAROSCOPIC HYSTERECTOMY TOTAL  2012    Procedure:LAPAROSCOPIC HYSTERECTOMY TOTAL; LAPAROSCOPIC TOTAL HYSTERECTOMY , BILATERAL SALPINGO-OOPHERECTOMY; Surgeon:NIKI QUEEN; Location:[facility] OR     TUBAL LIGATION         FAMILY HISTORY:  Family History   Problem Relation Age of Onset     Hypertension Mother      Hyperlipidemia Mother      Hypertension Father      Diabetes Father      Hyperlipidemia Father      Myocardial Infarction Father 84     Myocardial Infarction Maternal Grandfather      Myocardial Infarction Paternal Grandmother      Heart Disease Daughter         minor MVP       SOCIAL HISTORY:  Social History     Socioeconomic History     Marital status:      Spouse name: None     Number of children: None     Years of education: None     Highest education level: None   Tobacco Use     Smoking status: Former Smoker     Quit date: 2008     Years since quittin.3     Smokeless tobacco: Never Used   Substance and Sexual Activity     Alcohol use: Yes     Comment: 2 drinks a month     Drug use: No   Other Topics Concern     Caffeine Concern No     Comment: 1-2 cups per day     Sleep Concern Yes     Comment: sleep apnea , cpap     Weight Concern No     Special Diet No     Exercise Yes     Comment: some walking 2-3 days a week     Seat Belt Yes       Review of Systems:  Skin:  Negative     Eyes:  Negative    ENT:  Negative    Respiratory:  Positive for CPAP;sleep apnea  Cardiovascular:  Negative    Gastroenterology: Negative    Genitourinary:  Negative    Musculoskeletal:  Negative    Neurologic:  Negative    Psychiatric:  Negative    Heme/Lymph/Imm:  Negative    Endocrine:  Negative      Physical Exam:  Vitals: /80 (BP Location:  "Right arm, Patient Position: Sitting, Cuff Size: Adult Regular)   Pulse 66   Ht 1.6 m (5' 3\")   Wt 112.4 kg (247 lb 11.2 oz)   SpO2 97%   BMI 43.88 kg/m      Constitutional:  cooperative, alert and oriented, well developed, well nourished, in no acute distress        Skin:  warm and dry to the touch, no apparent skin lesions or masses noted   pacemaker incision in the left infraclavicular area was well-healed      Head:  normocephalic, no masses or lesions        Eyes:  pupils equal and round, conjunctivae and lids unremarkable, sclera white, no xanthalasma, EOMS intact, no nystagmus        Lymph:No Cervical lymphadenopathy present;No thyromegaly     ENT:           Neck:  carotid pulses are full and equal bilaterally, JVP normal, no carotid bruit        Respiratory:  normal breath sounds, clear to auscultation, normal A-P diameter, normal symmetry, normal respiratory excursion, no use of accessory muscles         Cardiac: regular rhythm, normal S1/S2, no S3 or S4, apical impulse not displaced, no murmurs, gallops or rubs                pulses full and equal, no bruits auscultated                                   body habitus makes fem pulse eval difficult, no fem bruits and good DP pulses    GI:  abdomen soft, non-tender, BS normoactive, no mass, no HSM, no bruits obese      Extremities and Muscular Skeletal:  no deformities, clubbing, cyanosis, erythema observed   bilateral LE edema;trace          Neurological:  no gross motor deficits        Psych:  Alert and Oriented x 3      Recent Lab Results:  LIPID RESULTS:  Lab Results   Component Value Date    CHOL 213 (H) 09/16/2020    HDL 57 09/16/2020     09/16/2020    TRIG 162 (H) 09/16/2020    CHOLHDLRATIO 3.68 05/30/2014       LIVER ENZYME RESULTS:  Lab Results   Component Value Date    AST 16 04/01/2016    ALT 29 04/01/2016       CBC RESULTS:  Lab Results   Component Value Date    WBC 9.1 04/01/2016    RBC 4.48 04/01/2016    HGB 12.5 04/01/2016    HCT " 38.7 04/01/2016    MCV 86 04/01/2016    MCH 27.9 04/01/2016    MCHC 32.3 04/01/2016    RDW 13.3 04/01/2016     04/01/2016       BMP RESULTS:  Lab Results   Component Value Date     09/16/2020    POTASSIUM 4.7 09/16/2020    CHLORIDE 106 09/16/2020    CO2 24 09/16/2020    ANIONGAP 11 09/16/2020     (H) 09/16/2020    BUN 17 09/16/2020    CR 0.87 09/16/2020    GFRESTIMATED >60 09/16/2020    GFRESTBLACK >60 09/16/2020    REGLA 9.5 09/16/2020        A1C RESULTS:  No results found for: A1C    INR RESULTS:  Lab Results   Component Value Date    INR 0.98 12/06/2011   CC  Agustin Madden MD  [address] [facility] [address]  [place] [id number]      Thank you for allowing me to participate in the care of your patient.      Sincerely,     Agustin Madden MD     [facility]

## 2022-05-18 NOTE — PROGRESS NOTES
HPI and Plan:   See dictation    No orders of the defined types were placed in this encounter.    Orders Placed This Encounter   Medications     rosuvastatin (CRESTOR) 20 MG tablet     Sig: Take 1 tablet (20 mg) by mouth daily     Dispense:  90 tablet     Refill:  3     Medications Discontinued During This Encounter   Medication Reason     rosuvastatin (CRESTOR) 20 MG tablet Reorder         Encounter Diagnoses   Name Primary?     Heart block      Morbid obesity (H)        CURRENT MEDICATIONS:  Current Outpatient Medications   Medication Sig Dispense Refill     Cholecalciferol (VITAMIN D) 2000 UNIT CAPS Take 1 tablet by mouth.       metoprolol (TOPROL-XL) 25 MG 24 hr tablet Take 25 mg by mouth At Bedtime.       omeprazole (PRILOSEC) 20 MG capsule Take 20 mg by mouth daily.       order for DME CPAP every night       rosuvastatin (CRESTOR) 20 MG tablet Take 1 tablet (20 mg) by mouth daily 90 tablet 3     sertraline (ZOLOFT) 50 MG tablet Take 50 mg by mouth At Bedtime.       cycloSPORINE (RESTASIS) 0.05 % ophthalmic emulsion 1 drop 2 times daily         ALLERGIES     Allergies   Allergen Reactions     Pcn [Penicillins] Rash       PAST MEDICAL HISTORY:  Past Medical History:   Diagnosis Date     Angina pectoris     normal cor artery-?syndrome X, ?Prinzmetal's     Anxiety      Arrhythmia     PVC     Costochondritis      Depression      GERD (gastroesophageal reflux disease)     normal EGD     Hyperlipidaemia      Hypertension      Impaired fasting glucose      Migraines      Pacemaker     Medtronic manny mclaughlin pacemaker serial [id number]     Second degree heart block 2003    Medtronic manny mclaughlin pacemaker serial [id number]     Shingles      Sleep apnea     CPAP       PAST SURGICAL HISTORY:  Past Surgical History:   Procedure Laterality Date     DILATION AND CURETTAGE  1/13/2012    Procedure:DILATION AND CURETTAGE; DILATION AND CURETTAGE ; Surgeon:NIKI QUEEN; Location: OR     H PERM PACER DBLE LEAD       IMPLANT  "PACEMAKER       LAPAROSCOPIC HYSTERECTOMY TOTAL  2012    Procedure:LAPAROSCOPIC HYSTERECTOMY TOTAL; LAPAROSCOPIC TOTAL HYSTERECTOMY , BILATERAL SALPINGO-OOPHERECTOMY; Surgeon:NIKI QUEEN; Location:[facility]     TUBAL LIGATION         FAMILY HISTORY:  Family History   Problem Relation Age of Onset     Hypertension Mother      Hyperlipidemia Mother      Hypertension Father      Diabetes Father      Hyperlipidemia Father      Myocardial Infarction Father 84     Myocardial Infarction Maternal Grandfather      Myocardial Infarction Paternal Grandmother      Heart Disease Daughter         minor MVP       SOCIAL HISTORY:  Social History     Socioeconomic History     Marital status:      Spouse name: None     Number of children: None     Years of education: None     Highest education level: None   Tobacco Use     Smoking status: Former Smoker     Quit date: 2008     Years since quittin.3     Smokeless tobacco: Never Used   Substance and Sexual Activity     Alcohol use: Yes     Comment: 2 drinks a month     Drug use: No   Other Topics Concern     Caffeine Concern No     Comment: 1-2 cups per day     Sleep Concern Yes     Comment: sleep apnea , cpap     Weight Concern No     Special Diet No     Exercise Yes     Comment: some walking 2-3 days a week     Seat Belt Yes       Review of Systems:  Skin:  Negative     Eyes:  Negative    ENT:  Negative    Respiratory:  Positive for CPAP;sleep apnea  Cardiovascular:  Negative    Gastroenterology: Negative    Genitourinary:  Negative    Musculoskeletal:  Negative    Neurologic:  Negative    Psychiatric:  Negative    Heme/Lymph/Imm:  Negative    Endocrine:  Negative      Physical Exam:  Vitals: /80 (BP Location: Right arm, Patient Position: Sitting, Cuff Size: Adult Regular)   Pulse 66   Ht 1.6 m (5' 3\")   Wt 112.4 kg (247 lb 11.2 oz)   SpO2 97%   BMI 43.88 kg/m      Constitutional:  cooperative, alert and oriented, well developed, well nourished, in " no acute distress        Skin:  warm and dry to the touch, no apparent skin lesions or masses noted   pacemaker incision in the left infraclavicular area was well-healed      Head:  normocephalic, no masses or lesions        Eyes:  pupils equal and round, conjunctivae and lids unremarkable, sclera white, no xanthalasma, EOMS intact, no nystagmus        Lymph:No Cervical lymphadenopathy present;No thyromegaly     ENT:           Neck:  carotid pulses are full and equal bilaterally, JVP normal, no carotid bruit        Respiratory:  normal breath sounds, clear to auscultation, normal A-P diameter, normal symmetry, normal respiratory excursion, no use of accessory muscles         Cardiac: regular rhythm, normal S1/S2, no S3 or S4, apical impulse not displaced, no murmurs, gallops or rubs                pulses full and equal, no bruits auscultated                                   body habitus makes fem pulse eval difficult, no fem bruits and good DP pulses    GI:  abdomen soft, non-tender, BS normoactive, no mass, no HSM, no bruits obese      Extremities and Muscular Skeletal:  no deformities, clubbing, cyanosis, erythema observed   bilateral LE edema;trace          Neurological:  no gross motor deficits        Psych:  Alert and Oriented x 3      Recent Lab Results:  LIPID RESULTS:  Lab Results   Component Value Date    CHOL 213 (H) 09/16/2020    HDL 57 09/16/2020     09/16/2020    TRIG 162 (H) 09/16/2020    CHOLHDLRATIO 3.68 05/30/2014       LIVER ENZYME RESULTS:  Lab Results   Component Value Date    AST 16 04/01/2016    ALT 29 04/01/2016       CBC RESULTS:  Lab Results   Component Value Date    WBC 9.1 04/01/2016    RBC 4.48 04/01/2016    HGB 12.5 04/01/2016    HCT 38.7 04/01/2016    MCV 86 04/01/2016    MCH 27.9 04/01/2016    MCHC 32.3 04/01/2016    RDW 13.3 04/01/2016     04/01/2016       BMP RESULTS:  Lab Results   Component Value Date     09/16/2020    POTASSIUM 4.7 09/16/2020    CHLORIDE 106  09/16/2020    CO2 24 09/16/2020    ANIONGAP 11 09/16/2020     (H) 09/16/2020    BUN 17 09/16/2020    CR 0.87 09/16/2020    GFRESTIMATED >60 09/16/2020    GFRESTBLACK >60 09/16/2020    REGLA 9.5 09/16/2020        A1C RESULTS:  No results found for: A1C    INR RESULTS:  Lab Results   Component Value Date    INR 0.98 12/06/2011           CC  Agustin Madden MD  [address]  [place] [id number]

## 2022-05-18 NOTE — PROGRESS NOTES
Service Date: 2022    CARDIOLOGY OFFICE NOTE    Emmy Shearer is a most delightful 66-year-old woman who I have seen for years.  I am always happy to see her.  She had initial history of AV block which necessitated placing a pacemaker.  It is interesting that for the last several years what we have seen though is atrial pacing about 24% of the time and rare ventricular pacing, so her AV conduction improved.  She does have a history of a brief SVT and PVCs, so she is on low-dose beta blocker for the SVT and the PVCs.  She reports no palpitations, no dizziness, no syncope.  The pacer evaluation was reviewed.  It is working well.  She is due for her annual in July of this year.  I also reviewed her blood work through [facility], as she asked me to do so.  We had increased her cholesterol pill last year, aiming for an LDL below 100.  She is now down to 98 according to their records.  Her HDL and triglycerides are quite good.  Hemoglobin A1c is mildly elevated.  She does have impaired fasting glucose, and we talked about that in the past.  Blood pressure is well controlled.  Her beta blocker was also for hypertension and that is why we are using the slightly tighter goal of LDL under 100 rather than less than 130 as the goal.      Today's visit was 16 minutes.  We will see her back in 1 year.    Agustin Madden MD    cc:  Hayley Hays MD  [facility]  [address] [place]  [address]      Agustin Madden MD        D: 2022   T: 2022   MT: mikey    Name:     EMMY SHEARER  MRN:      [id number]        Account:      [id number]   :      1955           Service Date: 2022       Document: [id number]

## 2022-08-10 ENCOUNTER — ANCILLARY PROCEDURE (OUTPATIENT)
Dept: CARDIOLOGY | Facility: CLINIC | Age: 67
End: 2022-08-10
Attending: INTERNAL MEDICINE
Payer: COMMERCIAL

## 2022-08-10 DIAGNOSIS — I44.1 SECOND DEGREE HEART BLOCK: Primary | ICD-10-CM

## 2022-08-10 DIAGNOSIS — Z95.0 CARDIAC PACEMAKER IN SITU: ICD-10-CM

## 2022-08-10 PROCEDURE — 93280 PM DEVICE PROGR EVAL DUAL: CPT | Performed by: INTERNAL MEDICINE

## 2022-08-24 LAB
MDC_IDC_MSMT_BATTERY_DTM: NORMAL
MDC_IDC_MSMT_BATTERY_IMPEDANCE: 1664 OHM
MDC_IDC_MSMT_BATTERY_REMAINING_LONGEVITY: 53 MO
MDC_IDC_MSMT_BATTERY_STATUS: NORMAL
MDC_IDC_MSMT_BATTERY_VOLTAGE: 2.76 V
MDC_IDC_MSMT_LEADCHNL_RA_IMPEDANCE_VALUE: 453 OHM
MDC_IDC_MSMT_LEADCHNL_RA_PACING_THRESHOLD_AMPLITUDE: 0.5 V
MDC_IDC_MSMT_LEADCHNL_RA_PACING_THRESHOLD_AMPLITUDE: 0.5 V
MDC_IDC_MSMT_LEADCHNL_RA_PACING_THRESHOLD_PULSEWIDTH: 0.4 MS
MDC_IDC_MSMT_LEADCHNL_RA_PACING_THRESHOLD_PULSEWIDTH: 0.4 MS
MDC_IDC_MSMT_LEADCHNL_RA_SENSING_INTR_AMPL: 4 MV
MDC_IDC_MSMT_LEADCHNL_RV_IMPEDANCE_VALUE: 829 OHM
MDC_IDC_MSMT_LEADCHNL_RV_PACING_THRESHOLD_AMPLITUDE: 0.88 V
MDC_IDC_MSMT_LEADCHNL_RV_PACING_THRESHOLD_AMPLITUDE: 1 V
MDC_IDC_MSMT_LEADCHNL_RV_PACING_THRESHOLD_PULSEWIDTH: 0.4 MS
MDC_IDC_MSMT_LEADCHNL_RV_PACING_THRESHOLD_PULSEWIDTH: 0.4 MS
MDC_IDC_MSMT_LEADCHNL_RV_SENSING_INTR_AMPL: 8 MV
MDC_IDC_PG_IMPLANT_DTM: NORMAL
MDC_IDC_PG_MFG: NORMAL
MDC_IDC_PG_MODEL: NORMAL
MDC_IDC_PG_SERIAL: NORMAL
MDC_IDC_PG_TYPE: NORMAL
MDC_IDC_SESS_CLINIC_NAME: NORMAL
MDC_IDC_SESS_DTM: NORMAL
MDC_IDC_SESS_TYPE: NORMAL
MDC_IDC_SET_BRADY_AT_MODE_SWITCH_MODE: NORMAL
MDC_IDC_SET_BRADY_AT_MODE_SWITCH_RATE: 175 {BEATS}/MIN
MDC_IDC_SET_BRADY_LOWRATE: 60 {BEATS}/MIN
MDC_IDC_SET_BRADY_MAX_SENSOR_RATE: 150 {BEATS}/MIN
MDC_IDC_SET_BRADY_MAX_TRACKING_RATE: 150 {BEATS}/MIN
MDC_IDC_SET_BRADY_MODE: NORMAL
MDC_IDC_SET_BRADY_PAV_DELAY_LOW: 150 MS
MDC_IDC_SET_BRADY_SAV_DELAY_LOW: 120 MS
MDC_IDC_SET_LEADCHNL_RA_PACING_AMPLITUDE: 1.5 V
MDC_IDC_SET_LEADCHNL_RA_PACING_CAPTURE_MODE: NORMAL
MDC_IDC_SET_LEADCHNL_RA_PACING_POLARITY: NORMAL
MDC_IDC_SET_LEADCHNL_RA_PACING_PULSEWIDTH: 0.4 MS
MDC_IDC_SET_LEADCHNL_RA_SENSING_POLARITY: NORMAL
MDC_IDC_SET_LEADCHNL_RA_SENSING_SENSITIVITY: 0.5 MV
MDC_IDC_SET_LEADCHNL_RV_PACING_AMPLITUDE: 2 V
MDC_IDC_SET_LEADCHNL_RV_PACING_CAPTURE_MODE: NORMAL
MDC_IDC_SET_LEADCHNL_RV_PACING_POLARITY: NORMAL
MDC_IDC_SET_LEADCHNL_RV_PACING_PULSEWIDTH: 0.4 MS
MDC_IDC_SET_LEADCHNL_RV_SENSING_POLARITY: NORMAL
MDC_IDC_SET_LEADCHNL_RV_SENSING_SENSITIVITY: 2.8 MV
MDC_IDC_SET_ZONE_DETECTION_INTERVAL: 333.33 MS
MDC_IDC_SET_ZONE_DETECTION_INTERVAL: 342.86 MS
MDC_IDC_SET_ZONE_TYPE: NORMAL
MDC_IDC_SET_ZONE_TYPE: NORMAL
MDC_IDC_STAT_AT_BURDEN_PERCENT: 0 %
MDC_IDC_STAT_AT_DTM_END: NORMAL
MDC_IDC_STAT_AT_DTM_START: NORMAL
MDC_IDC_STAT_AT_MODE_SW_COUNT: 0
MDC_IDC_STAT_BRADY_AP_VP_PERCENT: 0 %
MDC_IDC_STAT_BRADY_AP_VS_PERCENT: 25 %
MDC_IDC_STAT_BRADY_AS_VP_PERCENT: 0 %
MDC_IDC_STAT_BRADY_AS_VS_PERCENT: 75 %
MDC_IDC_STAT_BRADY_DTM_END: NORMAL
MDC_IDC_STAT_BRADY_DTM_START: NORMAL
MDC_IDC_STAT_EPISODE_RECENT_COUNT: 0
MDC_IDC_STAT_EPISODE_RECENT_COUNT: 0
MDC_IDC_STAT_EPISODE_RECENT_COUNT_DTM_END: NORMAL
MDC_IDC_STAT_EPISODE_RECENT_COUNT_DTM_END: NORMAL
MDC_IDC_STAT_EPISODE_RECENT_COUNT_DTM_START: NORMAL
MDC_IDC_STAT_EPISODE_RECENT_COUNT_DTM_START: NORMAL
MDC_IDC_STAT_EPISODE_TYPE: NORMAL
MDC_IDC_STAT_EPISODE_TYPE: NORMAL

## 2022-10-10 ENCOUNTER — HEALTH MAINTENANCE LETTER (OUTPATIENT)
Age: 67
End: 2022-10-10

## 2022-10-25 ENCOUNTER — HOSPITAL ENCOUNTER (OUTPATIENT)
Dept: MAMMOGRAPHY | Facility: CLINIC | Age: 67
Discharge: HOME | End: 2022-10-25
Attending: FAMILY MEDICINE | Admitting: FAMILY MEDICINE
Payer: COMMERCIAL

## 2022-10-25 DIAGNOSIS — Z12.31 VISIT FOR SCREENING MAMMOGRAM: ICD-10-CM

## 2022-10-25 PROCEDURE — 77067 SCR MAMMO BI INCL CAD: CPT

## 2022-11-09 ENCOUNTER — ANCILLARY PROCEDURE (OUTPATIENT)
Dept: CARDIOLOGY | Facility: CLINIC | Age: 67
End: 2022-11-09
Attending: INTERNAL MEDICINE
Payer: COMMERCIAL

## 2022-11-09 DIAGNOSIS — I44.1 SECOND DEGREE HEART BLOCK: ICD-10-CM

## 2022-11-09 DIAGNOSIS — Z95.0 CARDIAC PACEMAKER IN SITU: ICD-10-CM

## 2022-11-09 PROCEDURE — 93294 REM INTERROG EVL PM/LDLS PM: CPT | Performed by: INTERNAL MEDICINE

## 2022-11-09 PROCEDURE — 93296 REM INTERROG EVL PM/IDS: CPT | Performed by: INTERNAL MEDICINE

## 2022-11-15 LAB
MDC_IDC_MSMT_BATTERY_DTM: NORMAL
MDC_IDC_MSMT_BATTERY_IMPEDANCE: 1845 OHM
MDC_IDC_MSMT_BATTERY_REMAINING_LONGEVITY: 49 MO
MDC_IDC_MSMT_BATTERY_STATUS: NORMAL
MDC_IDC_MSMT_BATTERY_VOLTAGE: 2.76 V
MDC_IDC_MSMT_LEADCHNL_RA_IMPEDANCE_VALUE: 543 OHM
MDC_IDC_MSMT_LEADCHNL_RA_PACING_THRESHOLD_AMPLITUDE: 0.38 V
MDC_IDC_MSMT_LEADCHNL_RA_PACING_THRESHOLD_PULSEWIDTH: 0.4 MS
MDC_IDC_MSMT_LEADCHNL_RV_IMPEDANCE_VALUE: 907 OHM
MDC_IDC_MSMT_LEADCHNL_RV_PACING_THRESHOLD_AMPLITUDE: 1 V
MDC_IDC_MSMT_LEADCHNL_RV_PACING_THRESHOLD_PULSEWIDTH: 0.4 MS
MDC_IDC_PG_IMPLANT_DTM: NORMAL
MDC_IDC_PG_MFG: NORMAL
MDC_IDC_PG_MODEL: NORMAL
MDC_IDC_PG_SERIAL: NORMAL
MDC_IDC_PG_TYPE: NORMAL
MDC_IDC_SESS_CLINIC_NAME: NORMAL
MDC_IDC_SESS_DTM: NORMAL
MDC_IDC_SESS_TYPE: NORMAL
MDC_IDC_SET_BRADY_AT_MODE_SWITCH_MODE: NORMAL
MDC_IDC_SET_BRADY_AT_MODE_SWITCH_RATE: 175 {BEATS}/MIN
MDC_IDC_SET_BRADY_LOWRATE: 60 {BEATS}/MIN
MDC_IDC_SET_BRADY_MAX_SENSOR_RATE: 150 {BEATS}/MIN
MDC_IDC_SET_BRADY_MAX_TRACKING_RATE: 150 {BEATS}/MIN
MDC_IDC_SET_BRADY_MODE: NORMAL
MDC_IDC_SET_BRADY_PAV_DELAY_LOW: 150 MS
MDC_IDC_SET_BRADY_SAV_DELAY_LOW: 120 MS
MDC_IDC_SET_LEADCHNL_RA_PACING_AMPLITUDE: 1.5 V
MDC_IDC_SET_LEADCHNL_RA_PACING_CAPTURE_MODE: NORMAL
MDC_IDC_SET_LEADCHNL_RA_PACING_POLARITY: NORMAL
MDC_IDC_SET_LEADCHNL_RA_PACING_PULSEWIDTH: 0.4 MS
MDC_IDC_SET_LEADCHNL_RA_SENSING_POLARITY: NORMAL
MDC_IDC_SET_LEADCHNL_RA_SENSING_SENSITIVITY: 0.5 MV
MDC_IDC_SET_LEADCHNL_RV_PACING_AMPLITUDE: 2 V
MDC_IDC_SET_LEADCHNL_RV_PACING_CAPTURE_MODE: NORMAL
MDC_IDC_SET_LEADCHNL_RV_PACING_POLARITY: NORMAL
MDC_IDC_SET_LEADCHNL_RV_PACING_PULSEWIDTH: 0.4 MS
MDC_IDC_SET_LEADCHNL_RV_SENSING_POLARITY: NORMAL
MDC_IDC_SET_LEADCHNL_RV_SENSING_SENSITIVITY: 2.8 MV
MDC_IDC_SET_ZONE_DETECTION_INTERVAL: 333.33 MS
MDC_IDC_SET_ZONE_DETECTION_INTERVAL: 342.86 MS
MDC_IDC_SET_ZONE_TYPE: NORMAL
MDC_IDC_SET_ZONE_TYPE: NORMAL
MDC_IDC_STAT_AT_BURDEN_PERCENT: 0 %
MDC_IDC_STAT_AT_DTM_END: NORMAL
MDC_IDC_STAT_AT_DTM_START: NORMAL
MDC_IDC_STAT_AT_MODE_SW_COUNT: 0
MDC_IDC_STAT_BRADY_AP_VP_PERCENT: 0 %
MDC_IDC_STAT_BRADY_AP_VS_PERCENT: 24 %
MDC_IDC_STAT_BRADY_AS_VP_PERCENT: 0 %
MDC_IDC_STAT_BRADY_AS_VS_PERCENT: 76 %
MDC_IDC_STAT_BRADY_DTM_END: NORMAL
MDC_IDC_STAT_BRADY_DTM_START: NORMAL
MDC_IDC_STAT_EPISODE_RECENT_COUNT: 0
MDC_IDC_STAT_EPISODE_RECENT_COUNT: 0
MDC_IDC_STAT_EPISODE_RECENT_COUNT_DTM_END: NORMAL
MDC_IDC_STAT_EPISODE_RECENT_COUNT_DTM_END: NORMAL
MDC_IDC_STAT_EPISODE_RECENT_COUNT_DTM_START: NORMAL
MDC_IDC_STAT_EPISODE_RECENT_COUNT_DTM_START: NORMAL
MDC_IDC_STAT_EPISODE_TYPE: NORMAL
MDC_IDC_STAT_EPISODE_TYPE: NORMAL

## 2023-02-16 ENCOUNTER — ANCILLARY PROCEDURE (OUTPATIENT)
Dept: CARDIOLOGY | Facility: CLINIC | Age: 68
End: 2023-02-16
Attending: INTERNAL MEDICINE
Payer: COMMERCIAL

## 2023-02-16 DIAGNOSIS — Z95.0 CARDIAC PACEMAKER IN SITU: ICD-10-CM

## 2023-02-16 DIAGNOSIS — I44.1 SECOND DEGREE HEART BLOCK: ICD-10-CM

## 2023-02-16 PROCEDURE — 93294 REM INTERROG EVL PM/LDLS PM: CPT | Performed by: INTERNAL MEDICINE

## 2023-02-16 PROCEDURE — 93296 REM INTERROG EVL PM/IDS: CPT | Performed by: INTERNAL MEDICINE

## 2023-02-23 LAB
MDC_IDC_MSMT_BATTERY_DTM: NORMAL
MDC_IDC_MSMT_BATTERY_IMPEDANCE: 1916 OHM
MDC_IDC_MSMT_BATTERY_REMAINING_LONGEVITY: 47 MO
MDC_IDC_MSMT_BATTERY_STATUS: NORMAL
MDC_IDC_MSMT_BATTERY_VOLTAGE: 2.76 V
MDC_IDC_MSMT_LEADCHNL_RA_IMPEDANCE_VALUE: 488 OHM
MDC_IDC_MSMT_LEADCHNL_RA_PACING_THRESHOLD_AMPLITUDE: 0.38 V
MDC_IDC_MSMT_LEADCHNL_RA_PACING_THRESHOLD_PULSEWIDTH: 0.4 MS
MDC_IDC_MSMT_LEADCHNL_RV_IMPEDANCE_VALUE: 870 OHM
MDC_IDC_MSMT_LEADCHNL_RV_PACING_THRESHOLD_AMPLITUDE: 0.88 V
MDC_IDC_MSMT_LEADCHNL_RV_PACING_THRESHOLD_PULSEWIDTH: 0.4 MS
MDC_IDC_PG_IMPLANT_DTM: NORMAL
MDC_IDC_PG_MFG: NORMAL
MDC_IDC_PG_MODEL: NORMAL
MDC_IDC_PG_SERIAL: NORMAL
MDC_IDC_PG_TYPE: NORMAL
MDC_IDC_SESS_CLINIC_NAME: NORMAL
MDC_IDC_SESS_DTM: NORMAL
MDC_IDC_SESS_TYPE: NORMAL
MDC_IDC_SET_BRADY_AT_MODE_SWITCH_MODE: NORMAL
MDC_IDC_SET_BRADY_AT_MODE_SWITCH_RATE: 175 {BEATS}/MIN
MDC_IDC_SET_BRADY_LOWRATE: 60 {BEATS}/MIN
MDC_IDC_SET_BRADY_MAX_SENSOR_RATE: 150 {BEATS}/MIN
MDC_IDC_SET_BRADY_MAX_TRACKING_RATE: 150 {BEATS}/MIN
MDC_IDC_SET_BRADY_MODE: NORMAL
MDC_IDC_SET_BRADY_PAV_DELAY_LOW: 150 MS
MDC_IDC_SET_BRADY_SAV_DELAY_LOW: 120 MS
MDC_IDC_SET_LEADCHNL_RA_PACING_AMPLITUDE: 1.5 V
MDC_IDC_SET_LEADCHNL_RA_PACING_CAPTURE_MODE: NORMAL
MDC_IDC_SET_LEADCHNL_RA_PACING_POLARITY: NORMAL
MDC_IDC_SET_LEADCHNL_RA_PACING_PULSEWIDTH: 0.4 MS
MDC_IDC_SET_LEADCHNL_RA_SENSING_POLARITY: NORMAL
MDC_IDC_SET_LEADCHNL_RA_SENSING_SENSITIVITY: 0.5 MV
MDC_IDC_SET_LEADCHNL_RV_PACING_AMPLITUDE: 2 V
MDC_IDC_SET_LEADCHNL_RV_PACING_CAPTURE_MODE: NORMAL
MDC_IDC_SET_LEADCHNL_RV_PACING_POLARITY: NORMAL
MDC_IDC_SET_LEADCHNL_RV_PACING_PULSEWIDTH: 0.4 MS
MDC_IDC_SET_LEADCHNL_RV_SENSING_POLARITY: NORMAL
MDC_IDC_SET_LEADCHNL_RV_SENSING_SENSITIVITY: 2 MV
MDC_IDC_SET_ZONE_DETECTION_INTERVAL: 333.33 MS
MDC_IDC_SET_ZONE_DETECTION_INTERVAL: 342.86 MS
MDC_IDC_SET_ZONE_TYPE: NORMAL
MDC_IDC_SET_ZONE_TYPE: NORMAL
MDC_IDC_STAT_AT_BURDEN_PERCENT: 0 %
MDC_IDC_STAT_AT_DTM_END: NORMAL
MDC_IDC_STAT_AT_DTM_START: NORMAL
MDC_IDC_STAT_AT_MODE_SW_COUNT: 0
MDC_IDC_STAT_BRADY_AP_VP_PERCENT: 0 %
MDC_IDC_STAT_BRADY_AP_VS_PERCENT: 28 %
MDC_IDC_STAT_BRADY_AS_VP_PERCENT: 0 %
MDC_IDC_STAT_BRADY_AS_VS_PERCENT: 72 %
MDC_IDC_STAT_BRADY_DTM_END: NORMAL
MDC_IDC_STAT_BRADY_DTM_START: NORMAL
MDC_IDC_STAT_EPISODE_RECENT_COUNT: 0
MDC_IDC_STAT_EPISODE_RECENT_COUNT: 0
MDC_IDC_STAT_EPISODE_RECENT_COUNT_DTM_END: NORMAL
MDC_IDC_STAT_EPISODE_RECENT_COUNT_DTM_END: NORMAL
MDC_IDC_STAT_EPISODE_RECENT_COUNT_DTM_START: NORMAL
MDC_IDC_STAT_EPISODE_RECENT_COUNT_DTM_START: NORMAL
MDC_IDC_STAT_EPISODE_TYPE: NORMAL
MDC_IDC_STAT_EPISODE_TYPE: NORMAL

## 2023-06-07 ENCOUNTER — ANCILLARY PROCEDURE (OUTPATIENT)
Dept: CARDIOLOGY | Facility: CLINIC | Age: 68
End: 2023-06-07
Attending: INTERNAL MEDICINE
Payer: COMMERCIAL

## 2023-06-07 DIAGNOSIS — Z95.0 CARDIAC PACEMAKER IN SITU: ICD-10-CM

## 2023-06-07 DIAGNOSIS — I44.1 SECOND DEGREE HEART BLOCK: ICD-10-CM

## 2023-06-07 PROCEDURE — 93280 PM DEVICE PROGR EVAL DUAL: CPT | Performed by: INTERNAL MEDICINE

## 2023-06-08 ENCOUNTER — OFFICE VISIT (OUTPATIENT)
Dept: CARDIOLOGY | Facility: CLINIC | Age: 68
End: 2023-06-08
Attending: INTERNAL MEDICINE
Payer: COMMERCIAL

## 2023-06-08 VITALS
WEIGHT: 212.4 LBS | DIASTOLIC BLOOD PRESSURE: 68 MMHG | SYSTOLIC BLOOD PRESSURE: 122 MMHG | HEART RATE: 63 BPM | HEIGHT: 63 IN | BODY MASS INDEX: 37.63 KG/M2 | OXYGEN SATURATION: 96 %

## 2023-06-08 DIAGNOSIS — I44.1 SECOND DEGREE HEART BLOCK: Primary | ICD-10-CM

## 2023-06-08 PROCEDURE — 99213 OFFICE O/P EST LOW 20 MIN: CPT | Performed by: INTERNAL MEDICINE

## 2023-06-08 RX ORDER — ESTRADIOL 0.1 MG/G
CREAM VAGINAL
COMMUNITY
Start: 2022-12-05

## 2023-06-08 NOTE — PROGRESS NOTES
Service Date: 2023    CARDIOLOGY OFFICE NOTE    OFFICE NOTE:  Emmy Shearer returns for followup.  She is a delightful, 67-year-old woman.  She is in very good health.  She actually has been exercising to the point that she has lost significant weight, and she looks great.  She was seen here because of AV heart block and a pacemaker.  Interestingly, over the years, she does have sinus bradycardia, so her pacer was just checked, and she is using the atrial channel 34% of the time, but she is rarely using the ventricular channel.  Her native heartbeat is about 50 without the pacer.  She does have a history of palpitations, but is not at all bothered by them now and does not really feel them.  She is on low-dose metoprolol for those palpitations.  She is on Crestor and gets lab work through her internist, although I looked back, and I do not think she has had a lipid panel in the last 14-15 months, so her doctor may be doing them every other year now.  Overall, then, she is doing well. We will see her back in 1 year.  I told her she is not pacer dependent, fortunately, and we did not identify any atrial or ventricular arrhythmias on her pacer review today.    Today's visit was 21 minutes.    Agustin Madden MD     cc:  Hayley Hays MD   [facility]  [address] [place] [id number]    Agustin Madden MD        D: 2023   T: 2023   MT: eliana    Name:     EMMY SHEARER  MRN:      [id number]        Account:      [id number]   :      1955           Service Date: 2023       Document: [id number]

## 2023-06-08 NOTE — LETTER
6/8/2023    Hayley Hays MD  [facility] [address] [address] [facility] [address]    RE: Emmy Sawyer       Dear Colleague,     I had the pleasure of seeing Emmy Sawyer in the [facility].  HPI and Plan:   See dictation    No orders of the defined types were placed in this encounter.    Orders Placed This Encounter   Medications    estradiol (ESTRACE) 0.1 MG/GM vaginal cream     Sig: Place a pea-sized amount into the vaginal canal nightly for 2 weeks then 2-3 nights per week thereafter     Medications Discontinued During This Encounter   Medication Reason    cycloSPORINE (RESTASIS) 0.05 % ophthalmic emulsion          No diagnosis found.    CURRENT MEDICATIONS:  Current Outpatient Medications   Medication Sig Dispense Refill    Cholecalciferol (VITAMIN D) 2000 UNIT CAPS Take 1 tablet by mouth daily      estradiol (ESTRACE) 0.1 MG/GM vaginal cream Place a pea-sized amount into the vaginal canal nightly for 2 weeks then 2-3 nights per week thereafter      metoprolol (TOPROL-XL) 25 MG 24 hr tablet Take 25 mg by mouth At Bedtime.      omeprazole (PRILOSEC) 20 MG capsule Take 20 mg by mouth daily.      order for DME CPAP every night      rosuvastatin (CRESTOR) 20 MG tablet Take 1 tablet (20 mg) by mouth daily 90 tablet 3    sertraline (ZOLOFT) 50 MG tablet Take 50 mg by mouth At Bedtime.         ALLERGIES     Allergies   Allergen Reactions    Pcn [Penicillins] Rash       PAST MEDICAL HISTORY:  Past Medical History:   Diagnosis Date    Angina pectoris     normal cor artery-?syndrome X, ?Prinzmetal's    Anxiety     Arrhythmia     PVC, brief svt noted on pacer    Costochondritis     Depression     GERD (gastroesophageal reflux disease)     normal EGD    Hyperlipidaemia     Hypertension     Impaired fasting glucose     Migraines     Pacemaker     Medtronic adapta  pacemaker serial [id number]    Second degree heart block 2003    Medtronic adapta  pacemaker serial [id number]     Shingles     Sleep apnea     CPAP       PAST SURGICAL HISTORY:  Past Surgical History:   Procedure Laterality Date    DILATION AND CURETTAGE  1/13/2012    Procedure:DILATION AND CURETTAGE; DILATION AND CURETTAGE ; Surgeon:NIKI QUEEN; Location:[facility] OR    H PERM PACER DBLE LEAD      IMPLANT PACEMAKER  2003    LAPAROSCOPIC HYSTERECTOMY TOTAL  2/13/2012    Procedure:LAPAROSCOPIC HYSTERECTOMY TOTAL; LAPAROSCOPIC TOTAL HYSTERECTOMY , BILATERAL SALPINGO-OOPHERECTOMY; Surgeon:NIKI QUEEN; Location:[facility] OR    TUBAL LIGATION         FAMILY HISTORY:  Family History   Problem Relation Age of Onset    Hypertension Mother     Hyperlipidemia Mother     Hypertension Father     Diabetes Father     Hyperlipidemia Father     Myocardial Infarction Father 84    Myocardial Infarction Maternal Grandfather     Myocardial Infarction Paternal Grandmother     Heart Disease Daughter         minor MVP       SOCIAL HISTORY:  Social History     Socioeconomic History    Marital status:      Spouse name: None    Number of children: None    Years of education: None    Highest education level: None   Tobacco Use    Smoking status: Former     Types: Cigarettes     Quit date: 1/9/2008     Years since quitting: 15.4    Smokeless tobacco: Never   Substance and Sexual Activity    Alcohol use: Yes     Comment: 2 drinks a month    Drug use: No   Other Topics Concern    Caffeine Concern No     Comment: 1-2 cups per day    Sleep Concern Yes     Comment: sleep apnea , cpap    Weight Concern No    Special Diet No    Exercise Yes     Comment: some walking 2-3 days a week    Seat Belt Yes       Review of Systems:  Skin:        Eyes:       ENT:       Respiratory:  Positive for CPAP;sleep apnea  Cardiovascular:  Negative    Gastroenterology:      Genitourinary:       Musculoskeletal:       Neurologic:       Psychiatric:       Heme/Lymph/Imm:       Endocrine:         Physical Exam:  Vitals: /68 (BP Location: Right arm, Patient Position: Sitting,  "Cuff Size: Adult Regular)   Pulse 63   Ht 1.6 m (5' 3\")   Wt 96.3 kg (212 lb 6.4 oz)   SpO2 96%   BMI 37.62 kg/m      Constitutional:           Skin:             Head:           Eyes:           Lymph:      ENT:           Neck:           Respiratory:            Cardiac:                                                           GI:           Extremities and Muscular Skeletal:                 Neurological:           Psych:         Recent Lab Results:  LIPID RESULTS:  Lab Results   Component Value Date    CHOL 213 (H) 09/16/2020    HDL 57 09/16/2020     09/16/2020    TRIG 162 (H) 09/16/2020    CHOLHDLRATIO 3.68 05/30/2014       LIVER ENZYME RESULTS:  Lab Results   Component Value Date    AST 16 04/01/2016    ALT 29 04/01/2016       CBC RESULTS:  Lab Results   Component Value Date    WBC 9.1 04/01/2016    RBC 4.48 04/01/2016    HGB 12.5 04/01/2016    HCT 38.7 04/01/2016    MCV 86 04/01/2016    MCH 27.9 04/01/2016    MCHC 32.3 04/01/2016    RDW 13.3 04/01/2016     04/01/2016       BMP RESULTS:  Lab Results   Component Value Date     09/16/2020    POTASSIUM 4.7 09/16/2020    CHLORIDE 106 09/16/2020    CO2 24 09/16/2020    ANIONGAP 11 09/16/2020     (H) 09/16/2020    BUN 17 09/16/2020    CR 0.87 09/16/2020    GFRESTIMATED >60 09/16/2020    GFRESTBLACK >60 09/16/2020    REGLA 9.5 09/16/2020        A1C RESULTS:  No results found for: A1C    INR RESULTS:  Lab Results   Component Value Date    INR 0.98 12/06/2011           CC  Guanakito He MD  [address]  [place] [address]    Service Date: 06/08/2023    CARDIOLOGY OFFICE NOTE    OFFICE NOTE:  Emmy Sawyer returns for followup.  She is a delightful, 67-year-old woman.  She is in very good health.  She actually has been exercising to the point that she has lost significant weight, and she looks great.  She was seen here because of AV heart block and a pacemaker.  Interestingly, over the years, she does have sinus " bradycardia, so her pacer was just checked, and she is using the atrial channel 34% of the time, but she is rarely using the ventricular channel.  Her native heartbeat is about 50 without the pacer.  She does have a history of palpitations, but is not at all bothered by them now and does not really feel them.  She is on low-dose metoprolol for those palpitations.  She is on Crestor and gets lab work through her internist, although I looked back, and I do not think she has had a lipid panel in the last 14-15 months, so her doctor may be doing them every other year now.  Overall, then, she is doing well. We will see her back in 1 year.  I told her she is not pacer dependent, fortunately, and we did not identify any atrial or ventricular arrhythmias on her pacer review today.    Today's visit was 21 minutes.    Agustin Madden MD     cc:  Hayley Hays MD   [facility]  [id number] [place] [id number]    Agustin Madden MD        D: 2023   T: 2023   MT: eliana    Name:     DEBORAH SHEARER  MRN:      [id number]        Account:      [id number]   :      1955           Service Date: 2023       Document: [id number]     Thank you for allowing me to participate in the care of your patient.      Sincerely,     Agustin Madden MD     [facility]  cc:   Guanakito He MD  [address] [facility] [address]  [place] [address]

## 2023-06-08 NOTE — PROGRESS NOTES
HPI and Plan:   See dictation    No orders of the defined types were placed in this encounter.    Orders Placed This Encounter   Medications     estradiol (ESTRACE) 0.1 MG/GM vaginal cream     Sig: Place a pea-sized amount into the vaginal canal nightly for 2 weeks then 2-3 nights per week thereafter     Medications Discontinued During This Encounter   Medication Reason     cycloSPORINE (RESTASIS) 0.05 % ophthalmic emulsion          No diagnosis found.    CURRENT MEDICATIONS:  Current Outpatient Medications   Medication Sig Dispense Refill     Cholecalciferol (VITAMIN D) 2000 UNIT CAPS Take 1 tablet by mouth daily       estradiol (ESTRACE) 0.1 MG/GM vaginal cream Place a pea-sized amount into the vaginal canal nightly for 2 weeks then 2-3 nights per week thereafter       metoprolol (TOPROL-XL) 25 MG 24 hr tablet Take 25 mg by mouth At Bedtime.       omeprazole (PRILOSEC) 20 MG capsule Take 20 mg by mouth daily.       order for DME CPAP every night       rosuvastatin (CRESTOR) 20 MG tablet Take 1 tablet (20 mg) by mouth daily 90 tablet 3     sertraline (ZOLOFT) 50 MG tablet Take 50 mg by mouth At Bedtime.         ALLERGIES     Allergies   Allergen Reactions     Pcn [Penicillins] Rash       PAST MEDICAL HISTORY:  Past Medical History:   Diagnosis Date     Angina pectoris     normal cor artery-?syndrome X, ?Prinzmetal's     Anxiety      Arrhythmia     PVC, brief svt noted on pacer     Costochondritis      Depression      GERD (gastroesophageal reflux disease)     normal EGD     Hyperlipidaemia      Hypertension      Impaired fasting glucose      Migraines      Pacemaker     Medtronic manny mclaughlin pacemaker serial [id number]     Second degree heart block 2003    Medtronic manny mclaughlin pacemaker serial [id number]     Shingles      Sleep apnea     CPAP       PAST SURGICAL HISTORY:  Past Surgical History:   Procedure Laterality Date     DILATION AND CURETTAGE  1/13/2012    Procedure:DILATION AND CURETTAGE; DILATION AND CURETTAGE  "; Surgeon:NIKI QUEEN; Location:[facility] OR     H PERM PACER DBLE LEAD       IMPLANT PACEMAKER  2003     LAPAROSCOPIC HYSTERECTOMY TOTAL  2/13/2012    Procedure:LAPAROSCOPIC HYSTERECTOMY TOTAL; LAPAROSCOPIC TOTAL HYSTERECTOMY , BILATERAL SALPINGO-OOPHERECTOMY; Surgeon:NIKI QUEEN; Location:[facility] OR     TUBAL LIGATION         FAMILY HISTORY:  Family History   Problem Relation Age of Onset     Hypertension Mother      Hyperlipidemia Mother      Hypertension Father      Diabetes Father      Hyperlipidemia Father      Myocardial Infarction Father 84     Myocardial Infarction Maternal Grandfather      Myocardial Infarction Paternal Grandmother      Heart Disease Daughter         minor MVP       SOCIAL HISTORY:  Social History     Socioeconomic History     Marital status:      Spouse name: None     Number of children: None     Years of education: None     Highest education level: None   Tobacco Use     Smoking status: Former     Types: Cigarettes     Quit date: 1/9/2008     Years since quitting: 15.4     Smokeless tobacco: Never   Substance and Sexual Activity     Alcohol use: Yes     Comment: 2 drinks a month     Drug use: No   Other Topics Concern     Caffeine Concern No     Comment: 1-2 cups per day     Sleep Concern Yes     Comment: sleep apnea , cpap     Weight Concern No     Special Diet No     Exercise Yes     Comment: some walking 2-3 days a week     Seat Belt Yes       Review of Systems:  Skin:        Eyes:       ENT:       Respiratory:  Positive for CPAP;sleep apnea  Cardiovascular:  Negative    Gastroenterology:      Genitourinary:       Musculoskeletal:       Neurologic:       Psychiatric:       Heme/Lymph/Imm:       Endocrine:         Physical Exam:  Vitals: /68 (BP Location: Right arm, Patient Position: Sitting, Cuff Size: Adult Regular)   Pulse 63   Ht 1.6 m (5' 3\")   Wt 96.3 kg (212 lb 6.4 oz)   SpO2 96%   BMI 37.62 kg/m      Constitutional:           Skin:             Head:       "     Eyes:           Lymph:      ENT:           Neck:           Respiratory:            Cardiac:                                                           GI:           Extremities and Muscular Skeletal:                 Neurological:           Psych:         Recent Lab Results:  LIPID RESULTS:  Lab Results   Component Value Date    CHOL 213 (H) 09/16/2020    HDL 57 09/16/2020     09/16/2020    TRIG 162 (H) 09/16/2020    CHOLHDLRATIO 3.68 05/30/2014       LIVER ENZYME RESULTS:  Lab Results   Component Value Date    AST 16 04/01/2016    ALT 29 04/01/2016       CBC RESULTS:  Lab Results   Component Value Date    WBC 9.1 04/01/2016    RBC 4.48 04/01/2016    HGB 12.5 04/01/2016    HCT 38.7 04/01/2016    MCV 86 04/01/2016    MCH 27.9 04/01/2016    MCHC 32.3 04/01/2016    RDW 13.3 04/01/2016     04/01/2016       BMP RESULTS:  Lab Results   Component Value Date     09/16/2020    POTASSIUM 4.7 09/16/2020    CHLORIDE 106 09/16/2020    CO2 24 09/16/2020    ANIONGAP 11 09/16/2020     (H) 09/16/2020    BUN 17 09/16/2020    CR 0.87 09/16/2020    GFRESTIMATED >60 09/16/2020    GFRESTBLACK >60 09/16/2020    REGLA 9.5 09/16/2020        A1C RESULTS:  No results found for: A1C    INR RESULTS:  Lab Results   Component Value Date    INR 0.98 12/06/2011           CC  Guanakito He MD  [address]  [place] [address]

## 2023-06-09 LAB
MDC_IDC_MSMT_BATTERY_DTM: NORMAL
MDC_IDC_MSMT_BATTERY_IMPEDANCE: 2109 OHM
MDC_IDC_MSMT_BATTERY_REMAINING_LONGEVITY: 42 MO
MDC_IDC_MSMT_BATTERY_STATUS: NORMAL
MDC_IDC_MSMT_BATTERY_VOLTAGE: 2.75 V
MDC_IDC_MSMT_LEADCHNL_RA_IMPEDANCE_VALUE: 434 OHM
MDC_IDC_MSMT_LEADCHNL_RA_PACING_THRESHOLD_AMPLITUDE: 0.5 V
MDC_IDC_MSMT_LEADCHNL_RA_PACING_THRESHOLD_AMPLITUDE: 0.5 V
MDC_IDC_MSMT_LEADCHNL_RA_PACING_THRESHOLD_PULSEWIDTH: 0.4 MS
MDC_IDC_MSMT_LEADCHNL_RA_PACING_THRESHOLD_PULSEWIDTH: 0.4 MS
MDC_IDC_MSMT_LEADCHNL_RA_SENSING_INTR_AMPL: 4 MV
MDC_IDC_MSMT_LEADCHNL_RV_IMPEDANCE_VALUE: 811 OHM
MDC_IDC_MSMT_LEADCHNL_RV_PACING_THRESHOLD_AMPLITUDE: 1 V
MDC_IDC_MSMT_LEADCHNL_RV_PACING_THRESHOLD_AMPLITUDE: 1.25 V
MDC_IDC_MSMT_LEADCHNL_RV_PACING_THRESHOLD_PULSEWIDTH: 0.4 MS
MDC_IDC_MSMT_LEADCHNL_RV_PACING_THRESHOLD_PULSEWIDTH: 0.4 MS
MDC_IDC_MSMT_LEADCHNL_RV_SENSING_INTR_AMPL: 5.6 MV
MDC_IDC_PG_IMPLANT_DTM: NORMAL
MDC_IDC_PG_MFG: NORMAL
MDC_IDC_PG_MODEL: NORMAL
MDC_IDC_PG_SERIAL: NORMAL
MDC_IDC_PG_TYPE: NORMAL
MDC_IDC_SESS_CLINIC_NAME: NORMAL
MDC_IDC_SESS_DTM: NORMAL
MDC_IDC_SESS_TYPE: NORMAL
MDC_IDC_SET_BRADY_AT_MODE_SWITCH_MODE: NORMAL
MDC_IDC_SET_BRADY_AT_MODE_SWITCH_RATE: 175 {BEATS}/MIN
MDC_IDC_SET_BRADY_LOWRATE: 60 {BEATS}/MIN
MDC_IDC_SET_BRADY_MAX_SENSOR_RATE: 150 {BEATS}/MIN
MDC_IDC_SET_BRADY_MAX_TRACKING_RATE: 150 {BEATS}/MIN
MDC_IDC_SET_BRADY_MODE: NORMAL
MDC_IDC_SET_BRADY_PAV_DELAY_LOW: 150 MS
MDC_IDC_SET_BRADY_SAV_DELAY_LOW: 120 MS
MDC_IDC_SET_LEADCHNL_RA_PACING_AMPLITUDE: 1.5 V
MDC_IDC_SET_LEADCHNL_RA_PACING_CAPTURE_MODE: NORMAL
MDC_IDC_SET_LEADCHNL_RA_PACING_POLARITY: NORMAL
MDC_IDC_SET_LEADCHNL_RA_PACING_PULSEWIDTH: 0.4 MS
MDC_IDC_SET_LEADCHNL_RA_SENSING_POLARITY: NORMAL
MDC_IDC_SET_LEADCHNL_RA_SENSING_SENSITIVITY: 0.5 MV
MDC_IDC_SET_LEADCHNL_RV_PACING_AMPLITUDE: 2 V
MDC_IDC_SET_LEADCHNL_RV_PACING_CAPTURE_MODE: NORMAL
MDC_IDC_SET_LEADCHNL_RV_PACING_POLARITY: NORMAL
MDC_IDC_SET_LEADCHNL_RV_PACING_PULSEWIDTH: 0.4 MS
MDC_IDC_SET_LEADCHNL_RV_SENSING_POLARITY: NORMAL
MDC_IDC_SET_LEADCHNL_RV_SENSING_SENSITIVITY: 2 MV
MDC_IDC_SET_ZONE_DETECTION_INTERVAL: 333.33 MS
MDC_IDC_SET_ZONE_DETECTION_INTERVAL: 342.86 MS
MDC_IDC_SET_ZONE_TYPE: NORMAL
MDC_IDC_SET_ZONE_TYPE: NORMAL
MDC_IDC_STAT_AT_BURDEN_PERCENT: 0 %
MDC_IDC_STAT_AT_DTM_END: NORMAL
MDC_IDC_STAT_AT_DTM_START: NORMAL
MDC_IDC_STAT_AT_MODE_SW_COUNT: 0
MDC_IDC_STAT_BRADY_AP_VP_PERCENT: 0 %
MDC_IDC_STAT_BRADY_AP_VS_PERCENT: 34 %
MDC_IDC_STAT_BRADY_AS_VP_PERCENT: 0 %
MDC_IDC_STAT_BRADY_AS_VS_PERCENT: 65 %
MDC_IDC_STAT_BRADY_DTM_END: NORMAL
MDC_IDC_STAT_BRADY_DTM_START: NORMAL
MDC_IDC_STAT_EPISODE_RECENT_COUNT: 0
MDC_IDC_STAT_EPISODE_RECENT_COUNT: 0
MDC_IDC_STAT_EPISODE_RECENT_COUNT_DTM_END: NORMAL
MDC_IDC_STAT_EPISODE_RECENT_COUNT_DTM_END: NORMAL
MDC_IDC_STAT_EPISODE_RECENT_COUNT_DTM_START: NORMAL
MDC_IDC_STAT_EPISODE_RECENT_COUNT_DTM_START: NORMAL
MDC_IDC_STAT_EPISODE_TYPE: NORMAL
MDC_IDC_STAT_EPISODE_TYPE: NORMAL

## 2023-09-07 ENCOUNTER — ANCILLARY PROCEDURE (OUTPATIENT)
Dept: CARDIOLOGY | Facility: CLINIC | Age: 68
End: 2023-09-07
Attending: INTERNAL MEDICINE
Payer: COMMERCIAL

## 2023-09-07 DIAGNOSIS — Z95.0 CARDIAC PACEMAKER IN SITU: ICD-10-CM

## 2023-09-07 DIAGNOSIS — I44.1 SECOND DEGREE HEART BLOCK: ICD-10-CM

## 2023-09-07 PROCEDURE — 93296 REM INTERROG EVL PM/IDS: CPT | Performed by: INTERNAL MEDICINE

## 2023-09-07 PROCEDURE — 93294 REM INTERROG EVL PM/LDLS PM: CPT | Performed by: INTERNAL MEDICINE

## 2023-09-09 LAB
MDC_IDC_MSMT_BATTERY_DTM: NORMAL
MDC_IDC_MSMT_BATTERY_IMPEDANCE: 1925 OHM
MDC_IDC_MSMT_BATTERY_REMAINING_LONGEVITY: 46 MO
MDC_IDC_MSMT_BATTERY_STATUS: NORMAL
MDC_IDC_MSMT_BATTERY_VOLTAGE: 2.75 V
MDC_IDC_MSMT_LEADCHNL_RA_IMPEDANCE_VALUE: 506 OHM
MDC_IDC_MSMT_LEADCHNL_RA_PACING_THRESHOLD_AMPLITUDE: 0.38 V
MDC_IDC_MSMT_LEADCHNL_RA_PACING_THRESHOLD_PULSEWIDTH: 0.4 MS
MDC_IDC_MSMT_LEADCHNL_RV_IMPEDANCE_VALUE: 861 OHM
MDC_IDC_MSMT_LEADCHNL_RV_PACING_THRESHOLD_AMPLITUDE: 0.88 V
MDC_IDC_MSMT_LEADCHNL_RV_PACING_THRESHOLD_PULSEWIDTH: 0.4 MS
MDC_IDC_PG_IMPLANT_DTM: NORMAL
MDC_IDC_PG_MFG: NORMAL
MDC_IDC_PG_MODEL: NORMAL
MDC_IDC_PG_SERIAL: NORMAL
MDC_IDC_PG_TYPE: NORMAL
MDC_IDC_SESS_CLINIC_NAME: NORMAL
MDC_IDC_SESS_DTM: NORMAL
MDC_IDC_SESS_TYPE: NORMAL
MDC_IDC_SET_BRADY_AT_MODE_SWITCH_MODE: NORMAL
MDC_IDC_SET_BRADY_AT_MODE_SWITCH_RATE: 175 {BEATS}/MIN
MDC_IDC_SET_BRADY_LOWRATE: 60 {BEATS}/MIN
MDC_IDC_SET_BRADY_MAX_SENSOR_RATE: 150 {BEATS}/MIN
MDC_IDC_SET_BRADY_MAX_TRACKING_RATE: 150 {BEATS}/MIN
MDC_IDC_SET_BRADY_MODE: NORMAL
MDC_IDC_SET_BRADY_PAV_DELAY_LOW: 150 MS
MDC_IDC_SET_BRADY_SAV_DELAY_LOW: 120 MS
MDC_IDC_SET_LEADCHNL_RA_PACING_AMPLITUDE: 1.5 V
MDC_IDC_SET_LEADCHNL_RA_PACING_CAPTURE_MODE: NORMAL
MDC_IDC_SET_LEADCHNL_RA_PACING_POLARITY: NORMAL
MDC_IDC_SET_LEADCHNL_RA_PACING_PULSEWIDTH: 0.4 MS
MDC_IDC_SET_LEADCHNL_RA_SENSING_POLARITY: NORMAL
MDC_IDC_SET_LEADCHNL_RA_SENSING_SENSITIVITY: 0.5 MV
MDC_IDC_SET_LEADCHNL_RV_PACING_AMPLITUDE: 2 V
MDC_IDC_SET_LEADCHNL_RV_PACING_CAPTURE_MODE: NORMAL
MDC_IDC_SET_LEADCHNL_RV_PACING_POLARITY: NORMAL
MDC_IDC_SET_LEADCHNL_RV_PACING_PULSEWIDTH: 0.4 MS
MDC_IDC_SET_LEADCHNL_RV_SENSING_POLARITY: NORMAL
MDC_IDC_SET_LEADCHNL_RV_SENSING_SENSITIVITY: 2 MV
MDC_IDC_SET_ZONE_DETECTION_INTERVAL: 333.33 MS
MDC_IDC_SET_ZONE_DETECTION_INTERVAL: 342.86 MS
MDC_IDC_SET_ZONE_TYPE: NORMAL
MDC_IDC_SET_ZONE_TYPE: NORMAL
MDC_IDC_STAT_AT_BURDEN_PERCENT: 0 %
MDC_IDC_STAT_AT_DTM_END: NORMAL
MDC_IDC_STAT_AT_DTM_START: NORMAL
MDC_IDC_STAT_AT_MODE_SW_COUNT: 0
MDC_IDC_STAT_BRADY_AP_VP_PERCENT: 0 %
MDC_IDC_STAT_BRADY_AP_VS_PERCENT: 50 %
MDC_IDC_STAT_BRADY_AS_VP_PERCENT: 0 %
MDC_IDC_STAT_BRADY_AS_VS_PERCENT: 50 %
MDC_IDC_STAT_BRADY_DTM_END: NORMAL
MDC_IDC_STAT_BRADY_DTM_START: NORMAL
MDC_IDC_STAT_EPISODE_RECENT_COUNT: 0
MDC_IDC_STAT_EPISODE_RECENT_COUNT: 0
MDC_IDC_STAT_EPISODE_RECENT_COUNT_DTM_END: NORMAL
MDC_IDC_STAT_EPISODE_RECENT_COUNT_DTM_END: NORMAL
MDC_IDC_STAT_EPISODE_RECENT_COUNT_DTM_START: NORMAL
MDC_IDC_STAT_EPISODE_RECENT_COUNT_DTM_START: NORMAL
MDC_IDC_STAT_EPISODE_TYPE: NORMAL
MDC_IDC_STAT_EPISODE_TYPE: NORMAL

## 2023-10-26 ENCOUNTER — HOSPITAL ENCOUNTER (OUTPATIENT)
Dept: MAMMOGRAPHY | Facility: CLINIC | Age: 68
Discharge: HOME | End: 2023-10-26
Attending: FAMILY MEDICINE | Admitting: FAMILY MEDICINE
Payer: COMMERCIAL

## 2023-10-26 DIAGNOSIS — Z12.31 VISIT FOR SCREENING MAMMOGRAM: ICD-10-CM

## 2023-10-26 PROCEDURE — 77067 SCR MAMMO BI INCL CAD: CPT

## 2023-12-27 ENCOUNTER — NURSE TRIAGE (OUTPATIENT)
Dept: CARDIOLOGY | Facility: CLINIC | Age: 68
End: 2023-12-27
Payer: COMMERCIAL

## 2023-12-27 DIAGNOSIS — R06.02 SOB (SHORTNESS OF BREATH): ICD-10-CM

## 2023-12-27 DIAGNOSIS — I49.3 PVC'S (PREMATURE VENTRICULAR CONTRACTIONS): Primary | ICD-10-CM

## 2023-12-27 NOTE — TELEPHONE ENCOUNTER
"Patient spoke to Triage stating for the last couple weeks her heart rates have been in the 30's and 40's getting progressively worse. Patient has a PPM and says her heart rates should be around the 60's. She has been having lightheadedness, SOB and dizziness. She says she \"doesn't feel right\" today and lightheaded. She says it is worse today. Heart rates today have been 35 and 46. Her heart rate medhat up to 90 when she got up just now with activity and then went down to 65. Patient says she has a device check scheduled next week and wonders if this could be done sooner. Will route to the nurse with Dr. Madden and also the Device team to further follow-up with patient.     1. DESCRIPTION: \"Please describe your heart rate or heartbeat that you are having\" (e.g., fast/slow, regular/irregular, skipped or extra beats, \"palpitations\") Slow.  2. ONSET: \"When did it start?\" (Minutes, hours or days) For the last couple weeks.  3. DURATION: \"How long does it last\" (e.g., seconds, minutes, hours) Off and on.   4. PATTERN \"Does it come and go, or has it been constant since it started?\" \"Does it get worse with exertion?\" \"Are you feeling it now?\" Off and on. Has been occurring a couple weeks heart rates 30's and 40's. Current up to 65.  5. TAP: \"Using your hand, can you tap out what you are feeling on a chair or table in front of you, so that I can hear?\" (Note: not all patients can do this)  6. HEART RATE: \"Can you tell me your heart rate?\" \"How many beats in 15 seconds?\" (Note: not all patients can do this) Now is 65.  7. RECURRENT SYMPTOM: \"Have you ever had this before?\" If Yes, ask: \"When was the last time?\" and \"What happened that time?\" For a couple weeks. Patient has a PPM and supposed to be around HR of 60.   8. CAUSE: \"What do you think is causing the low heart rate?\" Cardiac cause.  9. CARDIAC HISTORY: \"Do you have any history of heart disease?\" (e.g., heart attack, angina, bypass surgery, angioplasty, arrhythmia) " "PPM, Second degree heart block.  10. OTHER SYMPTOMS: \"Do you have any other symptoms?\" (e.g., dizziness, chest pain, sweating, difficulty breathing) SOB, dizziness, lightheadedness.    "

## 2023-12-27 NOTE — TELEPHONE ENCOUNTER
Manual transmission received. Results as below:    Medtronic Adapta (D) Remote PPM Device Check  AP: 50.3%    : 0.3%    Mode: AAI-DDD     Presenting Rhythm: AP-VS 60s w/ trigeminal PVCs    Heart Rate: some variability, mostly from 60-80s per histogram   Sensing: WNL    Pacing Threshold: WNL    Impedance: WNL    Battery Status: estimated 3.5 (1-6) years remaining until RRT    Atrial Arrhythmia: 0    Ventricular Arrhythmia: 0      Pacemaker is stable. Her HR reading low is likely d/t her PVCs. Her PVC burden is extremely elevated when compared to prior check:    # of PVCs from 6/7/23 to 9/7/23:      # of PVCs from 6/7/23 to 12/27/23:

## 2023-12-27 NOTE — TELEPHONE ENCOUNTER
Called Pt she says per her kardia reader, and BP cuff she is getting readings in the 33-45 range for Heart rate, and feels light headed and dizzy. Discussed if she felt any fluttering in her chest, or rapid heart beats she said no. Per Pt BP running 140 systolic. Asked Pt to send a transmission to [facility] and would see what her rates are. Advised if sh efeels more poorly to go to ROBERTO Jacobo RN

## 2023-12-28 NOTE — TELEPHONE ENCOUNTER
Called Pt per DR Madden she states she is feeling better today, and things seem to have calm down some. Pt informed DR Madden would like echo labs and F/U Office visit. Pt agrees , and orders entered. Will have scheduling call her. JERRY titus RN

## 2023-12-29 NOTE — TELEPHONE ENCOUNTER
Pt called and wondered if she should do the transmission on Tuesday as scheduled.  This nurse recommended that she did, if only for the comparison purposes of how she is feeling with both of the transmission.  Pt agreed with plan.    SHAWN Arora

## 2024-01-02 ENCOUNTER — ANCILLARY PROCEDURE (OUTPATIENT)
Dept: CARDIOLOGY | Facility: CLINIC | Age: 69
End: 2024-01-02
Attending: INTERNAL MEDICINE
Payer: COMMERCIAL

## 2024-01-02 DIAGNOSIS — I44.1 SECOND DEGREE HEART BLOCK: ICD-10-CM

## 2024-01-02 DIAGNOSIS — Z95.0 CARDIAC PACEMAKER IN SITU: ICD-10-CM

## 2024-01-02 PROCEDURE — 93296 REM INTERROG EVL PM/IDS: CPT | Performed by: INTERNAL MEDICINE

## 2024-01-02 PROCEDURE — 93294 REM INTERROG EVL PM/LDLS PM: CPT | Performed by: INTERNAL MEDICINE

## 2024-01-05 ENCOUNTER — LAB (OUTPATIENT)
Dept: LAB | Facility: CLINIC | Age: 69
End: 2024-01-05
Payer: COMMERCIAL

## 2024-01-05 ENCOUNTER — HOSPITAL ENCOUNTER (OUTPATIENT)
Dept: CARDIOLOGY | Facility: CLINIC | Age: 69
Discharge: HOME | End: 2024-01-05
Attending: INTERNAL MEDICINE | Admitting: INTERNAL MEDICINE
Payer: COMMERCIAL

## 2024-01-05 DIAGNOSIS — R06.02 SOB (SHORTNESS OF BREATH): ICD-10-CM

## 2024-01-05 DIAGNOSIS — I49.3 PVC'S (PREMATURE VENTRICULAR CONTRACTIONS): ICD-10-CM

## 2024-01-05 LAB
ANION GAP SERPL CALCULATED.3IONS-SCNC: 10 MMOL/L (ref 7–15)
BUN SERPL-MCNC: 14.2 MG/DL (ref 8–23)
CALCIUM SERPL-MCNC: 9.3 MG/DL (ref 8.8–10.2)
CHLORIDE SERPL-SCNC: 106 MMOL/L (ref 98–107)
CREAT SERPL-MCNC: 0.74 MG/DL (ref 0.51–0.95)
DEPRECATED HCO3 PLAS-SCNC: 26 MMOL/L (ref 22–29)
EGFRCR SERPLBLD CKD-EPI 2021: 89 ML/MIN/1.73M2
GLUCOSE SERPL-MCNC: 75 MG/DL (ref 70–99)
LVEF ECHO: NORMAL
NT-PROBNP SERPL-MCNC: 90 PG/ML (ref 0–900)
POTASSIUM SERPL-SCNC: 4.2 MMOL/L (ref 3.4–5.3)
SODIUM SERPL-SCNC: 142 MMOL/L (ref 135–145)

## 2024-01-05 PROCEDURE — 36415 COLL VENOUS BLD VENIPUNCTURE: CPT | Performed by: INTERNAL MEDICINE

## 2024-01-05 PROCEDURE — 83880 ASSAY OF NATRIURETIC PEPTIDE: CPT | Performed by: INTERNAL MEDICINE

## 2024-01-05 PROCEDURE — 80048 BASIC METABOLIC PNL TOTAL CA: CPT | Performed by: INTERNAL MEDICINE

## 2024-01-05 PROCEDURE — 93306 TTE W/DOPPLER COMPLETE: CPT | Mod: 26 | Performed by: INTERNAL MEDICINE

## 2024-01-05 PROCEDURE — 93306 TTE W/DOPPLER COMPLETE: CPT

## 2024-01-08 LAB
MDC_IDC_MSMT_BATTERY_DTM: NORMAL
MDC_IDC_MSMT_BATTERY_IMPEDANCE: 2092 OHM
MDC_IDC_MSMT_BATTERY_REMAINING_LONGEVITY: 42 MO
MDC_IDC_MSMT_BATTERY_STATUS: NORMAL
MDC_IDC_MSMT_BATTERY_VOLTAGE: 2.73 V
MDC_IDC_MSMT_LEADCHNL_RA_IMPEDANCE_VALUE: 475 OHM
MDC_IDC_MSMT_LEADCHNL_RA_PACING_THRESHOLD_AMPLITUDE: 0.38 V
MDC_IDC_MSMT_LEADCHNL_RA_PACING_THRESHOLD_PULSEWIDTH: 0.4 MS
MDC_IDC_MSMT_LEADCHNL_RV_IMPEDANCE_VALUE: 865 OHM
MDC_IDC_MSMT_LEADCHNL_RV_PACING_THRESHOLD_AMPLITUDE: 1.12 V
MDC_IDC_MSMT_LEADCHNL_RV_PACING_THRESHOLD_PULSEWIDTH: 0.4 MS
MDC_IDC_PG_IMPLANT_DTM: NORMAL
MDC_IDC_PG_MFG: NORMAL
MDC_IDC_PG_MODEL: NORMAL
MDC_IDC_PG_SERIAL: NORMAL
MDC_IDC_PG_TYPE: NORMAL
MDC_IDC_SESS_CLINIC_NAME: NORMAL
MDC_IDC_SESS_DTM: NORMAL
MDC_IDC_SESS_TYPE: NORMAL
MDC_IDC_SET_BRADY_AT_MODE_SWITCH_MODE: NORMAL
MDC_IDC_SET_BRADY_AT_MODE_SWITCH_RATE: 175 {BEATS}/MIN
MDC_IDC_SET_BRADY_LOWRATE: 60 {BEATS}/MIN
MDC_IDC_SET_BRADY_MAX_SENSOR_RATE: 150 {BEATS}/MIN
MDC_IDC_SET_BRADY_MAX_TRACKING_RATE: 150 {BEATS}/MIN
MDC_IDC_SET_BRADY_MODE: NORMAL
MDC_IDC_SET_BRADY_PAV_DELAY_LOW: 150 MS
MDC_IDC_SET_BRADY_SAV_DELAY_LOW: 120 MS
MDC_IDC_SET_LEADCHNL_RA_PACING_AMPLITUDE: 1.5 V
MDC_IDC_SET_LEADCHNL_RA_PACING_CAPTURE_MODE: NORMAL
MDC_IDC_SET_LEADCHNL_RA_PACING_POLARITY: NORMAL
MDC_IDC_SET_LEADCHNL_RA_PACING_PULSEWIDTH: 0.4 MS
MDC_IDC_SET_LEADCHNL_RA_SENSING_POLARITY: NORMAL
MDC_IDC_SET_LEADCHNL_RA_SENSING_SENSITIVITY: 0.5 MV
MDC_IDC_SET_LEADCHNL_RV_PACING_AMPLITUDE: 2.25 V
MDC_IDC_SET_LEADCHNL_RV_PACING_CAPTURE_MODE: NORMAL
MDC_IDC_SET_LEADCHNL_RV_PACING_POLARITY: NORMAL
MDC_IDC_SET_LEADCHNL_RV_PACING_PULSEWIDTH: 0.4 MS
MDC_IDC_SET_LEADCHNL_RV_SENSING_POLARITY: NORMAL
MDC_IDC_SET_LEADCHNL_RV_SENSING_SENSITIVITY: 2 MV
MDC_IDC_SET_ZONE_DETECTION_INTERVAL: 333.33 MS
MDC_IDC_SET_ZONE_DETECTION_INTERVAL: 342.86 MS
MDC_IDC_SET_ZONE_STATUS: NORMAL
MDC_IDC_SET_ZONE_STATUS: NORMAL
MDC_IDC_SET_ZONE_TYPE: NORMAL
MDC_IDC_SET_ZONE_TYPE: NORMAL
MDC_IDC_SET_ZONE_VENDOR_TYPE: NORMAL
MDC_IDC_SET_ZONE_VENDOR_TYPE: NORMAL
MDC_IDC_STAT_AT_BURDEN_PERCENT: 0 %
MDC_IDC_STAT_AT_DTM_END: NORMAL
MDC_IDC_STAT_AT_DTM_START: NORMAL
MDC_IDC_STAT_AT_MODE_SW_COUNT: 0
MDC_IDC_STAT_BRADY_AP_VP_PERCENT: 0 %
MDC_IDC_STAT_BRADY_AP_VS_PERCENT: 50 %
MDC_IDC_STAT_BRADY_AS_VP_PERCENT: 0 %
MDC_IDC_STAT_BRADY_AS_VS_PERCENT: 49 %
MDC_IDC_STAT_BRADY_DTM_END: NORMAL
MDC_IDC_STAT_BRADY_DTM_START: NORMAL
MDC_IDC_STAT_EPISODE_RECENT_COUNT: 0
MDC_IDC_STAT_EPISODE_RECENT_COUNT: 0
MDC_IDC_STAT_EPISODE_RECENT_COUNT_DTM_END: NORMAL
MDC_IDC_STAT_EPISODE_RECENT_COUNT_DTM_END: NORMAL
MDC_IDC_STAT_EPISODE_RECENT_COUNT_DTM_START: NORMAL
MDC_IDC_STAT_EPISODE_RECENT_COUNT_DTM_START: NORMAL
MDC_IDC_STAT_EPISODE_TYPE: NORMAL
MDC_IDC_STAT_EPISODE_TYPE: NORMAL

## 2024-02-08 ENCOUNTER — OFFICE VISIT (OUTPATIENT)
Dept: CARDIOLOGY | Facility: CLINIC | Age: 69
End: 2024-02-08
Payer: COMMERCIAL

## 2024-02-08 VITALS
BODY MASS INDEX: 35.61 KG/M2 | OXYGEN SATURATION: 95 % | SYSTOLIC BLOOD PRESSURE: 112 MMHG | HEIGHT: 63 IN | WEIGHT: 201 LBS | HEART RATE: 61 BPM | DIASTOLIC BLOOD PRESSURE: 72 MMHG

## 2024-02-08 DIAGNOSIS — I44.1 SECOND DEGREE HEART BLOCK: ICD-10-CM

## 2024-02-08 PROCEDURE — 99214 OFFICE O/P EST MOD 30 MIN: CPT | Performed by: INTERNAL MEDICINE

## 2024-02-08 NOTE — PROGRESS NOTES
HPI and Plan:   I the pleasure of seeing Emmy again this is unscheduled visit.  Please see my previous note.  The patient was noticing some occasional lightheadedness she put her finger oximeter to read a heart rate of 34 which she instantly recognized was not right.  She has a pacemaker and that for about 70 pacemaker check was performed and she notes that the pacer is working well but she was having frequent PVCs most likely she may have been ventricular bigeminy and so the finger oximeter was counting half of beats.  Indeed we did order an echocardiogram which I reviewed with her there were runs of ventricular bigeminy and I showed her that with each bigeminal PVC beat the cardiac output was lower and that would explain her ectopy and her lightheadedness.    The etiology for the PVCs is unclear and we know she has a history of PVCs but they are not that frequent and she is on low-dose beta-blocker.  She caffeinated products but she noted she did start a new vitamin at around the same time that the PVCs started.  She is vitamin before she came into the office today for evaluation she states that PVCs or the dizziness seem to be gone so we do not know if there was something in the vitamin that is causing a problem.    We did discuss caffeinated products dark chocolate things like that which can make palpitations worse.  I would increase the metoprolol from 25 up to 50 if she wishes and she has a home blood pressure issue so check to make sure that she is not hypotensive but that similarly I told her she can try over-the-counter magnesium supplement to see if that helps but again currently she states the symptoms brought her to her office today has not got    She also asked about some back pain she has intermittent back pain it is never exertional.  21 years ago we did do a heart catheterization which was completely normal we did not look for coronary spasm at that time but the pain that she is talking about now  is back pain and the pain she was having 20 years ago with anterior chest discomfort.  I offered her a nuclear stress test if she wishes at this point she states she is happy with how she is doing she may take us up on the offer of increasing the metoprolol to 50 and over-the-counter magnesium she will call us if there is still a problem with lightheadedness and palpitation or if she wants to move forward with a stress test.  I would not do an angiogram based on symptoms that she is reporting now unless the stress test itself with suggestive of a problem.    Today's visit was 32 minutes we reviewed the actual echocardiogram the pacer chart the previous blood tests and the old angiogram  and the negative bnp which we ordered when she mentioned before this visit she had shortness of breath  Orders Placed This Encounter   Medications    cyanocobalamin (VITAMIN B-12) 500 MCG SUBL sublingual tablet     Sig: Place 500 mcg under the tongue every 3 days    UNABLE TO FIND     Sig: daily MEDICATION NAME: calcium 600mg vitamin d 20mcg     There are no discontinued medications.      Encounter Diagnosis   Name Primary?    Second degree heart block        CURRENT MEDICATIONS:  Current Outpatient Medications   Medication Sig Dispense Refill    Cholecalciferol (VITAMIN D) 2000 UNIT CAPS Take 1 tablet by mouth daily      cyanocobalamin (VITAMIN B-12) 500 MCG SUBL sublingual tablet Place 500 mcg under the tongue every 3 days      estradiol (ESTRACE) 0.1 MG/GM vaginal cream Place a pea-sized amount into the vaginal canal nightly for 2 weeks then 2-3 nights per week thereafter      metoprolol (TOPROL-XL) 25 MG 24 hr tablet Take 25 mg by mouth At Bedtime.      omeprazole (PRILOSEC) 20 MG capsule Take 20 mg by mouth daily.      order for DME CPAP every night      rosuvastatin (CRESTOR) 20 MG tablet Take 1 tablet (20 mg) by mouth daily 90 tablet 3    sertraline (ZOLOFT) 50 MG tablet Take 50 mg by mouth At Bedtime.      UNABLE TO FIND  daily MEDICATION NAME: calcium 600mg vitamin d 20mcg         ALLERGIES     Allergies   Allergen Reactions    Pcn [Penicillins] Rash       PAST MEDICAL HISTORY:  Past Medical History:   Diagnosis Date    Angina pectoris     normal cor artery-?syndrome X, ?Prinzmetal's    Anxiety     Arrhythmia     PVC, brief svt noted on pacer    Costochondritis     Depression     GERD (gastroesophageal reflux disease)     normal EGD    Hyperlipidaemia     Hypertension     Impaired fasting glucose     Migraines     Pacemaker     Medtronic adapta  pacemaker serial [id number]    Second degree heart block     Medtronic adapta  pacemaker serial [id number]    Shingles     Sleep apnea     CPAP       PAST SURGICAL HISTORY:  Past Surgical History:   Procedure Laterality Date    DILATION AND CURETTAGE  2012    Procedure:DILATION AND CURETTAGE; DILATION AND CURETTAGE ; Surgeon:NIKI QUEEN; Location:[facility]    H PERM PACER DBLE LEAD      IMPLANT PACEMAKER      LAPAROSCOPIC HYSTERECTOMY TOTAL  2012    Procedure:LAPAROSCOPIC HYSTERECTOMY TOTAL; LAPAROSCOPIC TOTAL HYSTERECTOMY , BILATERAL SALPINGO-OOPHERECTOMY; Surgeon:NIKI QUEEN; Location:[facility]    TUBAL LIGATION         FAMILY HISTORY:  Family History   Problem Relation Age of Onset    Breast Cancer Mother 87    Hypertension Mother     Hyperlipidemia Mother     Hypertension Father     Diabetes Father     Hyperlipidemia Father     Myocardial Infarction Father 84    Heart Disease Daughter         minor MVP    Myocardial Infarction Maternal Grandfather     Myocardial Infarction Paternal Grandmother        SOCIAL HISTORY:  Social History     Socioeconomic History    Marital status:      Spouse name: None    Number of children: None    Years of education: None    Highest education level: None   Tobacco Use    Smoking status: Former     Types: Cigarettes     Quit date: 2008     Years since quittin.0    Smokeless tobacco: Never   Substance and Sexual  "Activity    Alcohol use: Not Currently    Drug use: No   Other Topics Concern    Caffeine Concern No     Comment: 1-2 cups per day    Sleep Concern Yes     Comment: sleep apnea , cpap    Weight Concern No    Special Diet No    Exercise Yes     Comment: some walking 2-3 days a week    Seat Belt Yes       Review of Systems:  Skin:        Eyes:       ENT:       Respiratory:  Positive for sleep apnea;CPAP  Cardiovascular:  Negative    Gastroenterology:      Genitourinary:       Musculoskeletal:       Neurologic:       Psychiatric:       Heme/Lymph/Imm:       Endocrine:         Physical Exam:  Vitals: /72 (BP Location: Right arm, Patient Position: Sitting, Cuff Size: Adult Large)   Pulse 61   Ht 1.6 m (5' 3\")   Wt 91.2 kg (201 lb)   SpO2 95%   BMI 35.61 kg/m   Orthostatic Vitals from 02/06/24 1111 to 02/08/24 1111    Date and Time Orthostatic BP Orthostatic Pulse Patient Position BP   Location Cuff Size   02/08/24 1030 -- -- Sitting Right arm Adult Large         Constitutional:           Skin:             Head:           Eyes:           Lymph:      ENT:           Neck:           Respiratory:            Cardiac:                                                           GI:           Extremities and Muscular Skeletal:                 Neurological:           Psych:         Recent Lab Results:  LIPID RESULTS:  Lab Results   Component Value Date    CHOL 213 (H) 09/16/2020    HDL 57 09/16/2020     09/16/2020    TRIG 162 (H) 09/16/2020    CHOLHDLRATIO 3.68 05/30/2014       LIVER ENZYME RESULTS:  Lab Results   Component Value Date    AST 16 04/01/2016    ALT 29 04/01/2016       CBC RESULTS:  Lab Results   Component Value Date    WBC 9.1 04/01/2016    RBC 4.48 04/01/2016    HGB 12.5 04/01/2016    HCT 38.7 04/01/2016    MCV 86 04/01/2016    MCH 27.9 04/01/2016    MCHC 32.3 04/01/2016    RDW 13.3 04/01/2016     04/01/2016       BMP RESULTS:  Lab Results   Component Value Date     01/05/2024     " "09/16/2020    POTASSIUM 4.2 01/05/2024    POTASSIUM 4.7 09/16/2020    CHLORIDE 106 01/05/2024    CHLORIDE 106 09/16/2020    CO2 26 01/05/2024    CO2 24 09/16/2020    ANIONGAP 10 01/05/2024    ANIONGAP 11 09/16/2020    GLC 75 01/05/2024     (H) 09/16/2020    BUN 14.2 01/05/2024    BUN 17 09/16/2020    CR 0.74 01/05/2024    CR 0.87 09/16/2020    GFRESTIMATED 89 01/05/2024    GFRESTIMATED >60 09/16/2020    GFRESTBLACK >60 09/16/2020    REGLA 9.3 01/05/2024    REGLA 9.5 09/16/2020        A1C RESULTS:  No results found for: \"A1C\"    INR RESULTS:  Lab Results   Component Value Date    INR 0.98 12/06/2011           CC  Agustin Madden MD  [address]  [place] [id number]  "

## 2024-02-08 NOTE — LETTER
2/8/2024    Hayley Hays MD  [facility] [address]  [facility] [address]    RE: Emmy Sawyer       Dear Colleague,     I had the pleasure of seeing Emmy Sawyer in the [facility].  HPI and Plan:   I the pleasure of seeing Emmy again this is unscheduled visit.  Please see my previous note.  The patient was noticing some occasional lightheadedness she put her finger oximeter to read a heart rate of 34 which she instantly recognized was not right.  She has a pacemaker and that for about 70 pacemaker check was performed and she notes that the pacer is working well but she was having frequent PVCs most likely she may have been ventricular bigeminy and so the finger oximeter was counting half of beats.  Indeed we did order an echocardiogram which I reviewed with her there were runs of ventricular bigeminy and I showed her that with each bigeminal PVC beat the cardiac output was lower and that would explain her ectopy and her lightheadedness.    The etiology for the PVCs is unclear and we know she has a history of PVCs but they are not that frequent and she is on low-dose beta-blocker.  She caffeinated products but she noted she did start a new vitamin at around the same time that the PVCs started.  She is vitamin before she came into the office today for evaluation she states that PVCs or the dizziness seem to be gone so we do not know if there was something in the vitamin that is causing a problem.    We did discuss caffeinated products dark chocolate things like that which can make palpitations worse.  I would increase the metoprolol from 25 up to 50 if she wishes and she has a home blood pressure issue so check to make sure that she is not hypotensive but that similarly I told her she can try over-the-counter magnesium supplement to see if that helps but again currently she states the symptoms brought her to her office today has not got    She also asked about  some back pain she has intermittent back pain it is never exertional.  21 years ago we did do a heart catheterization which was completely normal we did not look for coronary spasm at that time but the pain that she is talking about now is back pain and the pain she was having 20 years ago with anterior chest discomfort.  I offered her a nuclear stress test if she wishes at this point she states she is happy with how she is doing she may take us up on the offer of increasing the metoprolol to 50 and over-the-counter magnesium she will call us if there is still a problem with lightheadedness and palpitation or if she wants to move forward with a stress test.  I would not do an angiogram based on symptoms that she is reporting now unless the stress test itself with suggestive of a problem.    Today's visit was 32 minutes we reviewed the actual echocardiogram the pacer chart the previous blood tests and the old angiogram  and the negative bnp which we ordered when she mentioned before this visit she had shortness of breath  Orders Placed This Encounter   Medications    cyanocobalamin (VITAMIN B-12) 500 MCG SUBL sublingual tablet     Sig: Place 500 mcg under the tongue every 3 days    UNABLE TO FIND     Sig: daily MEDICATION NAME: calcium 600mg vitamin d 20mcg     There are no discontinued medications.      Encounter Diagnosis   Name Primary?    Second degree heart block        CURRENT MEDICATIONS:  Current Outpatient Medications   Medication Sig Dispense Refill    Cholecalciferol (VITAMIN D) 2000 UNIT CAPS Take 1 tablet by mouth daily      cyanocobalamin (VITAMIN B-12) 500 MCG SUBL sublingual tablet Place 500 mcg under the tongue every 3 days      estradiol (ESTRACE) 0.1 MG/GM vaginal cream Place a pea-sized amount into the vaginal canal nightly for 2 weeks then 2-3 nights per week thereafter      metoprolol (TOPROL-XL) 25 MG 24 hr tablet Take 25 mg by mouth At Bedtime.      omeprazole (PRILOSEC) 20 MG capsule Take 20  mg by mouth daily.      order for DME CPAP every night      rosuvastatin (CRESTOR) 20 MG tablet Take 1 tablet (20 mg) by mouth daily 90 tablet 3    sertraline (ZOLOFT) 50 MG tablet Take 50 mg by mouth At Bedtime.      UNABLE TO FIND daily MEDICATION NAME: calcium 600mg vitamin d 20mcg         ALLERGIES     Allergies   Allergen Reactions    Pcn [Penicillins] Rash       PAST MEDICAL HISTORY:  Past Medical History:   Diagnosis Date    Angina pectoris     normal cor artery-?syndrome X, ?Prinzmetal's    Anxiety     Arrhythmia     PVC, brief svt noted on pacer    Costochondritis     Depression     GERD (gastroesophageal reflux disease)     normal EGD    Hyperlipidaemia     Hypertension     Impaired fasting glucose     Migraines     Pacemaker     Medtronic adapta dr pacemaker serial [id number]    Second degree heart block 2003    Medtronic adapta dr pacemaker serial [id number]    Shingles     Sleep apnea     CPAP       PAST SURGICAL HISTORY:  Past Surgical History:   Procedure Laterality Date    DILATION AND CURETTAGE  1/13/2012    Procedure:DILATION AND CURETTAGE; DILATION AND CURETTAGE ; Surgeon:NIKI QUEEN; Location:[facility]    H PERM PACER DBLE LEAD      IMPLANT PACEMAKER  2003    LAPAROSCOPIC HYSTERECTOMY TOTAL  2/13/2012    Procedure:LAPAROSCOPIC HYSTERECTOMY TOTAL; LAPAROSCOPIC TOTAL HYSTERECTOMY , BILATERAL SALPINGO-OOPHERECTOMY; Surgeon:NIKI QUEEN; Location:[facility]    TUBAL LIGATION         FAMILY HISTORY:  Family History   Problem Relation Age of Onset    Breast Cancer Mother 87    Hypertension Mother     Hyperlipidemia Mother     Hypertension Father     Diabetes Father     Hyperlipidemia Father     Myocardial Infarction Father 84    Heart Disease Daughter         minor MVP    Myocardial Infarction Maternal Grandfather     Myocardial Infarction Paternal Grandmother        SOCIAL HISTORY:  Social History     Socioeconomic History    Marital status:      Spouse name: None    Number of children: None     "Years of education: None    Highest education level: None   Tobacco Use    Smoking status: Former     Types: Cigarettes     Quit date: 2008     Years since quittin.0    Smokeless tobacco: Never   Substance and Sexual Activity    Alcohol use: Not Currently    Drug use: No   Other Topics Concern    Caffeine Concern No     Comment: 1-2 cups per day    Sleep Concern Yes     Comment: sleep apnea , cpap    Weight Concern No    Special Diet No    Exercise Yes     Comment: some walking 2-3 days a week    Seat Belt Yes       Review of Systems:  Skin:        Eyes:       ENT:       Respiratory:  Positive for sleep apnea;CPAP  Cardiovascular:  Negative    Gastroenterology:      Genitourinary:       Musculoskeletal:       Neurologic:       Psychiatric:       Heme/Lymph/Imm:       Endocrine:         Physical Exam:  Vitals: /72 (BP Location: Right arm, Patient Position: Sitting, Cuff Size: Adult Large)   Pulse 61   Ht 1.6 m (5' 3\")   Wt 91.2 kg (201 lb)   SpO2 95%   BMI 35.61 kg/m   Orthostatic Vitals from 24 1111 to 24 1111    Date and Time Orthostatic BP Orthostatic Pulse Patient Position BP   Location Cuff Size   24 1030 -- -- Sitting Right arm Adult Large         Constitutional:           Skin:             Head:           Eyes:           Lymph:      ENT:           Neck:           Respiratory:            Cardiac:                                                           GI:           Extremities and Muscular Skeletal:                 Neurological:           Psych:         Recent Lab Results:  LIPID RESULTS:  Lab Results   Component Value Date    CHOL 213 (H) 2020    HDL 57 2020     2020    TRIG 162 (H) 2020    CHOLHDLRATIO 3.68 2014       LIVER ENZYME RESULTS:  Lab Results   Component Value Date    AST 16 2016    ALT 29 2016       CBC RESULTS:  Lab Results   Component Value Date    WBC 9.1 2016    RBC 4.48 2016    HGB 12.5 " "04/01/2016    HCT 38.7 04/01/2016    MCV 86 04/01/2016    MCH 27.9 04/01/2016    MCHC 32.3 04/01/2016    RDW 13.3 04/01/2016     04/01/2016       BMP RESULTS:  Lab Results   Component Value Date     01/05/2024     09/16/2020    POTASSIUM 4.2 01/05/2024    POTASSIUM 4.7 09/16/2020    CHLORIDE 106 01/05/2024    CHLORIDE 106 09/16/2020    CO2 26 01/05/2024    CO2 24 09/16/2020    ANIONGAP 10 01/05/2024    ANIONGAP 11 09/16/2020    GLC 75 01/05/2024     (H) 09/16/2020    BUN 14.2 01/05/2024    BUN 17 09/16/2020    CR 0.74 01/05/2024    CR 0.87 09/16/2020    GFRESTIMATED 89 01/05/2024    GFRESTIMATED >60 09/16/2020    GFRESTBLACK >60 09/16/2020    REGLA 9.3 01/05/2024    REGLA 9.5 09/16/2020        A1C RESULTS:  No results found for: \"A1C\"    INR RESULTS:  Lab Results   Component Value Date    INR 0.98 12/06/2011         CC  Agustin Madden MD  [address]  [place]  [place] [address]      Thank you for allowing me to participate in the care of your patient.      Sincerely,     Agustin Madden MD     [facility]  "

## 2024-04-22 ENCOUNTER — ANCILLARY PROCEDURE (OUTPATIENT)
Dept: CARDIOLOGY | Facility: CLINIC | Age: 69
End: 2024-04-22
Attending: INTERNAL MEDICINE
Payer: COMMERCIAL

## 2024-04-22 DIAGNOSIS — Z95.0 CARDIAC PACEMAKER IN SITU: Primary | ICD-10-CM

## 2024-04-22 DIAGNOSIS — I44.1 SECOND DEGREE HEART BLOCK: ICD-10-CM

## 2024-04-22 DIAGNOSIS — Z95.0 CARDIAC PACEMAKER IN SITU: ICD-10-CM

## 2024-04-22 PROCEDURE — 93294 REM INTERROG EVL PM/LDLS PM: CPT | Performed by: INTERNAL MEDICINE

## 2024-04-22 PROCEDURE — 93296 REM INTERROG EVL PM/IDS: CPT | Performed by: INTERNAL MEDICINE

## 2024-04-23 LAB
MDC_IDC_MSMT_BATTERY_DTM: NORMAL
MDC_IDC_MSMT_BATTERY_IMPEDANCE: 2302 OHM
MDC_IDC_MSMT_BATTERY_REMAINING_LONGEVITY: 39 MO
MDC_IDC_MSMT_BATTERY_STATUS: NORMAL
MDC_IDC_MSMT_BATTERY_VOLTAGE: 2.73 V
MDC_IDC_MSMT_LEADCHNL_RA_IMPEDANCE_VALUE: 485 OHM
MDC_IDC_MSMT_LEADCHNL_RA_PACING_THRESHOLD_AMPLITUDE: 0.38 V
MDC_IDC_MSMT_LEADCHNL_RA_PACING_THRESHOLD_PULSEWIDTH: 0.4 MS
MDC_IDC_MSMT_LEADCHNL_RV_IMPEDANCE_VALUE: 852 OHM
MDC_IDC_MSMT_LEADCHNL_RV_PACING_THRESHOLD_AMPLITUDE: 1 V
MDC_IDC_MSMT_LEADCHNL_RV_PACING_THRESHOLD_PULSEWIDTH: 0.4 MS
MDC_IDC_PG_IMPLANT_DTM: NORMAL
MDC_IDC_PG_MFG: NORMAL
MDC_IDC_PG_MODEL: NORMAL
MDC_IDC_PG_SERIAL: NORMAL
MDC_IDC_PG_TYPE: NORMAL
MDC_IDC_SESS_CLINIC_NAME: NORMAL
MDC_IDC_SESS_DTM: NORMAL
MDC_IDC_SESS_TYPE: NORMAL
MDC_IDC_SET_BRADY_AT_MODE_SWITCH_MODE: NORMAL
MDC_IDC_SET_BRADY_AT_MODE_SWITCH_RATE: 175 {BEATS}/MIN
MDC_IDC_SET_BRADY_LOWRATE: 60 {BEATS}/MIN
MDC_IDC_SET_BRADY_MAX_SENSOR_RATE: 150 {BEATS}/MIN
MDC_IDC_SET_BRADY_MAX_TRACKING_RATE: 150 {BEATS}/MIN
MDC_IDC_SET_BRADY_MODE: NORMAL
MDC_IDC_SET_BRADY_PAV_DELAY_LOW: 150 MS
MDC_IDC_SET_BRADY_SAV_DELAY_LOW: 120 MS
MDC_IDC_SET_LEADCHNL_RA_PACING_AMPLITUDE: 1.5 V
MDC_IDC_SET_LEADCHNL_RA_PACING_CAPTURE_MODE: NORMAL
MDC_IDC_SET_LEADCHNL_RA_PACING_POLARITY: NORMAL
MDC_IDC_SET_LEADCHNL_RA_PACING_PULSEWIDTH: 0.4 MS
MDC_IDC_SET_LEADCHNL_RA_SENSING_POLARITY: NORMAL
MDC_IDC_SET_LEADCHNL_RA_SENSING_SENSITIVITY: 0.5 MV
MDC_IDC_SET_LEADCHNL_RV_PACING_AMPLITUDE: 2 V
MDC_IDC_SET_LEADCHNL_RV_PACING_CAPTURE_MODE: NORMAL
MDC_IDC_SET_LEADCHNL_RV_PACING_POLARITY: NORMAL
MDC_IDC_SET_LEADCHNL_RV_PACING_PULSEWIDTH: 0.4 MS
MDC_IDC_SET_LEADCHNL_RV_SENSING_POLARITY: NORMAL
MDC_IDC_SET_LEADCHNL_RV_SENSING_SENSITIVITY: 2 MV
MDC_IDC_SET_ZONE_DETECTION_INTERVAL: 333.33 MS
MDC_IDC_SET_ZONE_DETECTION_INTERVAL: 342.86 MS
MDC_IDC_SET_ZONE_STATUS: NORMAL
MDC_IDC_SET_ZONE_STATUS: NORMAL
MDC_IDC_SET_ZONE_TYPE: NORMAL
MDC_IDC_SET_ZONE_TYPE: NORMAL
MDC_IDC_SET_ZONE_VENDOR_TYPE: NORMAL
MDC_IDC_SET_ZONE_VENDOR_TYPE: NORMAL
MDC_IDC_STAT_AT_BURDEN_PERCENT: 0 %
MDC_IDC_STAT_AT_DTM_END: NORMAL
MDC_IDC_STAT_AT_DTM_START: NORMAL
MDC_IDC_STAT_AT_MODE_SW_COUNT: 0
MDC_IDC_STAT_BRADY_AP_VP_PERCENT: 0 %
MDC_IDC_STAT_BRADY_AP_VS_PERCENT: 53 %
MDC_IDC_STAT_BRADY_AS_VP_PERCENT: 0 %
MDC_IDC_STAT_BRADY_AS_VS_PERCENT: 46 %
MDC_IDC_STAT_BRADY_DTM_END: NORMAL
MDC_IDC_STAT_BRADY_DTM_START: NORMAL
MDC_IDC_STAT_EPISODE_RECENT_COUNT: 0
MDC_IDC_STAT_EPISODE_RECENT_COUNT: 0
MDC_IDC_STAT_EPISODE_RECENT_COUNT_DTM_END: NORMAL
MDC_IDC_STAT_EPISODE_RECENT_COUNT_DTM_END: NORMAL
MDC_IDC_STAT_EPISODE_RECENT_COUNT_DTM_START: NORMAL
MDC_IDC_STAT_EPISODE_RECENT_COUNT_DTM_START: NORMAL
MDC_IDC_STAT_EPISODE_TYPE: NORMAL
MDC_IDC_STAT_EPISODE_TYPE: NORMAL

## 2024-08-07 ENCOUNTER — ANCILLARY PROCEDURE (OUTPATIENT)
Dept: CARDIOLOGY | Facility: CLINIC | Age: 69
End: 2024-08-07
Attending: INTERNAL MEDICINE
Payer: COMMERCIAL

## 2024-08-07 DIAGNOSIS — I44.1 SECOND DEGREE HEART BLOCK: ICD-10-CM

## 2024-08-07 DIAGNOSIS — Z95.0 CARDIAC PACEMAKER IN SITU: ICD-10-CM

## 2024-08-07 LAB
MDC_IDC_MSMT_BATTERY_DTM: NORMAL
MDC_IDC_MSMT_BATTERY_IMPEDANCE: 2333 OHM
MDC_IDC_MSMT_BATTERY_REMAINING_LONGEVITY: 38 MO
MDC_IDC_MSMT_BATTERY_STATUS: NORMAL
MDC_IDC_MSMT_BATTERY_VOLTAGE: 2.74 V
MDC_IDC_MSMT_LEADCHNL_RA_IMPEDANCE_VALUE: 448 OHM
MDC_IDC_MSMT_LEADCHNL_RA_PACING_THRESHOLD_AMPLITUDE: 0.5 V
MDC_IDC_MSMT_LEADCHNL_RA_PACING_THRESHOLD_AMPLITUDE: 1 V
MDC_IDC_MSMT_LEADCHNL_RA_PACING_THRESHOLD_PULSEWIDTH: 0.4 MS
MDC_IDC_MSMT_LEADCHNL_RA_PACING_THRESHOLD_PULSEWIDTH: 0.4 MS
MDC_IDC_MSMT_LEADCHNL_RA_SENSING_INTR_AMPL: 4 MV
MDC_IDC_MSMT_LEADCHNL_RV_IMPEDANCE_VALUE: 842 OHM
MDC_IDC_MSMT_LEADCHNL_RV_PACING_THRESHOLD_AMPLITUDE: 1 V
MDC_IDC_MSMT_LEADCHNL_RV_PACING_THRESHOLD_AMPLITUDE: 1.12 V
MDC_IDC_MSMT_LEADCHNL_RV_PACING_THRESHOLD_PULSEWIDTH: 0.4 MS
MDC_IDC_MSMT_LEADCHNL_RV_PACING_THRESHOLD_PULSEWIDTH: 0.4 MS
MDC_IDC_MSMT_LEADCHNL_RV_SENSING_INTR_AMPL: 4 MV
MDC_IDC_PG_IMPLANT_DTM: NORMAL
MDC_IDC_PG_MFG: NORMAL
MDC_IDC_PG_MODEL: NORMAL
MDC_IDC_PG_SERIAL: NORMAL
MDC_IDC_PG_TYPE: NORMAL
MDC_IDC_SESS_CLINIC_NAME: NORMAL
MDC_IDC_SESS_DTM: NORMAL
MDC_IDC_SESS_TYPE: NORMAL
MDC_IDC_SET_BRADY_AT_MODE_SWITCH_MODE: NORMAL
MDC_IDC_SET_BRADY_AT_MODE_SWITCH_RATE: 175 {BEATS}/MIN
MDC_IDC_SET_BRADY_LOWRATE: 60 {BEATS}/MIN
MDC_IDC_SET_BRADY_MAX_SENSOR_RATE: 150 {BEATS}/MIN
MDC_IDC_SET_BRADY_MAX_TRACKING_RATE: 150 {BEATS}/MIN
MDC_IDC_SET_BRADY_MODE: NORMAL
MDC_IDC_SET_BRADY_PAV_DELAY_LOW: 150 MS
MDC_IDC_SET_BRADY_SAV_DELAY_LOW: 120 MS
MDC_IDC_SET_LEADCHNL_RA_PACING_AMPLITUDE: 2 V
MDC_IDC_SET_LEADCHNL_RA_PACING_CAPTURE_MODE: NORMAL
MDC_IDC_SET_LEADCHNL_RA_PACING_POLARITY: NORMAL
MDC_IDC_SET_LEADCHNL_RA_PACING_PULSEWIDTH: 0.4 MS
MDC_IDC_SET_LEADCHNL_RA_SENSING_POLARITY: NORMAL
MDC_IDC_SET_LEADCHNL_RA_SENSING_SENSITIVITY: 0.5 MV
MDC_IDC_SET_LEADCHNL_RV_PACING_AMPLITUDE: 2.25 V
MDC_IDC_SET_LEADCHNL_RV_PACING_CAPTURE_MODE: NORMAL
MDC_IDC_SET_LEADCHNL_RV_PACING_POLARITY: NORMAL
MDC_IDC_SET_LEADCHNL_RV_PACING_PULSEWIDTH: 0.4 MS
MDC_IDC_SET_LEADCHNL_RV_SENSING_POLARITY: NORMAL
MDC_IDC_SET_LEADCHNL_RV_SENSING_SENSITIVITY: 2 MV
MDC_IDC_SET_ZONE_DETECTION_INTERVAL: 333.33 MS
MDC_IDC_SET_ZONE_DETECTION_INTERVAL: 342.86 MS
MDC_IDC_SET_ZONE_STATUS: NORMAL
MDC_IDC_SET_ZONE_STATUS: NORMAL
MDC_IDC_SET_ZONE_TYPE: NORMAL
MDC_IDC_SET_ZONE_TYPE: NORMAL
MDC_IDC_SET_ZONE_VENDOR_TYPE: NORMAL
MDC_IDC_SET_ZONE_VENDOR_TYPE: NORMAL
MDC_IDC_STAT_AT_BURDEN_PERCENT: 0 %
MDC_IDC_STAT_AT_DTM_END: NORMAL
MDC_IDC_STAT_AT_DTM_START: NORMAL
MDC_IDC_STAT_AT_MODE_SW_COUNT: 0
MDC_IDC_STAT_BRADY_AP_VP_PERCENT: 0 %
MDC_IDC_STAT_BRADY_AP_VS_PERCENT: 54 %
MDC_IDC_STAT_BRADY_AS_VP_PERCENT: 1 %
MDC_IDC_STAT_BRADY_AS_VS_PERCENT: 46 %
MDC_IDC_STAT_BRADY_DTM_END: NORMAL
MDC_IDC_STAT_BRADY_DTM_START: NORMAL
MDC_IDC_STAT_EPISODE_RECENT_COUNT: 0
MDC_IDC_STAT_EPISODE_RECENT_COUNT: 0
MDC_IDC_STAT_EPISODE_RECENT_COUNT_DTM_END: NORMAL
MDC_IDC_STAT_EPISODE_RECENT_COUNT_DTM_END: NORMAL
MDC_IDC_STAT_EPISODE_RECENT_COUNT_DTM_START: NORMAL
MDC_IDC_STAT_EPISODE_RECENT_COUNT_DTM_START: NORMAL
MDC_IDC_STAT_EPISODE_TYPE: NORMAL
MDC_IDC_STAT_EPISODE_TYPE: NORMAL

## 2024-08-07 PROCEDURE — 93280 PM DEVICE PROGR EVAL DUAL: CPT | Performed by: INTERNAL MEDICINE

## 2024-10-28 ENCOUNTER — HOSPITAL ENCOUNTER (OUTPATIENT)
Dept: MAMMOGRAPHY | Facility: CLINIC | Age: 69
Discharge: HOME | End: 2024-10-28
Attending: FAMILY MEDICINE | Admitting: FAMILY MEDICINE
Payer: COMMERCIAL

## 2024-10-28 DIAGNOSIS — Z12.31 VISIT FOR SCREENING MAMMOGRAM: ICD-10-CM

## 2024-10-28 PROCEDURE — 77063 BREAST TOMOSYNTHESIS BI: CPT

## 2024-11-05 ENCOUNTER — ANCILLARY PROCEDURE (OUTPATIENT)
Dept: CARDIOLOGY | Facility: CLINIC | Age: 69
End: 2024-11-05
Attending: INTERNAL MEDICINE
Payer: COMMERCIAL

## 2024-11-05 DIAGNOSIS — Z95.0 CARDIAC PACEMAKER IN SITU: ICD-10-CM

## 2024-11-05 DIAGNOSIS — I44.1 SECOND DEGREE HEART BLOCK: ICD-10-CM

## 2024-11-05 PROCEDURE — 93296 REM INTERROG EVL PM/IDS: CPT | Performed by: INTERNAL MEDICINE

## 2024-11-05 PROCEDURE — 93294 REM INTERROG EVL PM/LDLS PM: CPT | Performed by: INTERNAL MEDICINE

## 2024-11-14 LAB
MDC_IDC_MSMT_BATTERY_DTM: NORMAL
MDC_IDC_MSMT_BATTERY_IMPEDANCE: 3086 OHM
MDC_IDC_MSMT_BATTERY_REMAINING_LONGEVITY: 29 MO
MDC_IDC_MSMT_BATTERY_STATUS: NORMAL
MDC_IDC_MSMT_BATTERY_VOLTAGE: 2.74 V
MDC_IDC_MSMT_LEADCHNL_RA_IMPEDANCE_VALUE: 513 OHM
MDC_IDC_MSMT_LEADCHNL_RA_PACING_THRESHOLD_AMPLITUDE: 0.38 V
MDC_IDC_MSMT_LEADCHNL_RA_PACING_THRESHOLD_PULSEWIDTH: 0.4 MS
MDC_IDC_MSMT_LEADCHNL_RV_IMPEDANCE_VALUE: 867 OHM
MDC_IDC_MSMT_LEADCHNL_RV_PACING_THRESHOLD_AMPLITUDE: 0.75 V
MDC_IDC_MSMT_LEADCHNL_RV_PACING_THRESHOLD_PULSEWIDTH: 0.4 MS
MDC_IDC_PG_IMPLANT_DTM: NORMAL
MDC_IDC_PG_MFG: NORMAL
MDC_IDC_PG_MODEL: NORMAL
MDC_IDC_PG_SERIAL: NORMAL
MDC_IDC_PG_TYPE: NORMAL
MDC_IDC_SESS_CLINIC_NAME: NORMAL
MDC_IDC_SESS_DTM: NORMAL
MDC_IDC_SESS_TYPE: NORMAL
MDC_IDC_SET_BRADY_AT_MODE_SWITCH_MODE: NORMAL
MDC_IDC_SET_BRADY_AT_MODE_SWITCH_RATE: 175 {BEATS}/MIN
MDC_IDC_SET_BRADY_LOWRATE: 60 {BEATS}/MIN
MDC_IDC_SET_BRADY_MAX_SENSOR_RATE: 150 {BEATS}/MIN
MDC_IDC_SET_BRADY_MAX_TRACKING_RATE: 150 {BEATS}/MIN
MDC_IDC_SET_BRADY_MODE: NORMAL
MDC_IDC_SET_BRADY_PAV_DELAY_LOW: 150 MS
MDC_IDC_SET_BRADY_SAV_DELAY_LOW: 120 MS
MDC_IDC_SET_LEADCHNL_RA_PACING_AMPLITUDE: 1.5 V
MDC_IDC_SET_LEADCHNL_RA_PACING_CAPTURE_MODE: NORMAL
MDC_IDC_SET_LEADCHNL_RA_PACING_POLARITY: NORMAL
MDC_IDC_SET_LEADCHNL_RA_PACING_PULSEWIDTH: 0.4 MS
MDC_IDC_SET_LEADCHNL_RA_SENSING_POLARITY: NORMAL
MDC_IDC_SET_LEADCHNL_RA_SENSING_SENSITIVITY: 0.5 MV
MDC_IDC_SET_LEADCHNL_RV_PACING_AMPLITUDE: 2 V
MDC_IDC_SET_LEADCHNL_RV_PACING_CAPTURE_MODE: NORMAL
MDC_IDC_SET_LEADCHNL_RV_PACING_POLARITY: NORMAL
MDC_IDC_SET_LEADCHNL_RV_PACING_PULSEWIDTH: 0.4 MS
MDC_IDC_SET_LEADCHNL_RV_SENSING_POLARITY: NORMAL
MDC_IDC_SET_LEADCHNL_RV_SENSING_SENSITIVITY: 2 MV
MDC_IDC_SET_ZONE_DETECTION_INTERVAL: 333.33 MS
MDC_IDC_SET_ZONE_DETECTION_INTERVAL: 342.86 MS
MDC_IDC_SET_ZONE_STATUS: NORMAL
MDC_IDC_SET_ZONE_STATUS: NORMAL
MDC_IDC_SET_ZONE_TYPE: NORMAL
MDC_IDC_SET_ZONE_TYPE: NORMAL
MDC_IDC_SET_ZONE_VENDOR_TYPE: NORMAL
MDC_IDC_SET_ZONE_VENDOR_TYPE: NORMAL
MDC_IDC_STAT_AT_BURDEN_PERCENT: 0 %
MDC_IDC_STAT_AT_DTM_END: NORMAL
MDC_IDC_STAT_AT_DTM_START: NORMAL
MDC_IDC_STAT_AT_MODE_SW_COUNT: 0
MDC_IDC_STAT_BRADY_AP_VP_PERCENT: 0 %
MDC_IDC_STAT_BRADY_AP_VS_PERCENT: 59 %
MDC_IDC_STAT_BRADY_AS_VP_PERCENT: 1 %
MDC_IDC_STAT_BRADY_AS_VS_PERCENT: 40 %
MDC_IDC_STAT_BRADY_DTM_END: NORMAL
MDC_IDC_STAT_BRADY_DTM_START: NORMAL
MDC_IDC_STAT_EPISODE_RECENT_COUNT: 0
MDC_IDC_STAT_EPISODE_RECENT_COUNT: 0
MDC_IDC_STAT_EPISODE_RECENT_COUNT_DTM_END: NORMAL
MDC_IDC_STAT_EPISODE_RECENT_COUNT_DTM_END: NORMAL
MDC_IDC_STAT_EPISODE_RECENT_COUNT_DTM_START: NORMAL
MDC_IDC_STAT_EPISODE_RECENT_COUNT_DTM_START: NORMAL
MDC_IDC_STAT_EPISODE_TYPE: NORMAL
MDC_IDC_STAT_EPISODE_TYPE: NORMAL

## 2025-01-28 ENCOUNTER — TELEPHONE (OUTPATIENT)
Dept: CARDIOLOGY | Facility: CLINIC | Age: 70
End: 2025-01-28
Payer: COMMERCIAL

## 2025-01-28 DIAGNOSIS — I44.1 SECOND DEGREE HEART BLOCK: ICD-10-CM

## 2025-01-28 DIAGNOSIS — Z95.0 PACEMAKER: Primary | ICD-10-CM

## 2025-01-28 NOTE — TELEPHONE ENCOUNTER
Health Call Center    Phone Message    May a detailed message be left on voicemail: yes     Reason for Call: Other: Patient calling to schedule for annual follow up and in clinic device check. Please place order for in clinic device check  and contact patient to schedule and coordinate appointments. Thank you     Action Taken: Other: cardiology    Travel Screening: Not Applicable  Thank you!  [facility]       Date of Service:

## 2025-02-11 ENCOUNTER — TELEPHONE (OUTPATIENT)
Dept: CARDIOLOGY | Facility: CLINIC | Age: 70
End: 2025-02-11

## 2025-02-11 ENCOUNTER — ANCILLARY PROCEDURE (OUTPATIENT)
Dept: CARDIOLOGY | Facility: CLINIC | Age: 70
End: 2025-02-11
Attending: INTERNAL MEDICINE
Payer: COMMERCIAL

## 2025-02-11 DIAGNOSIS — I44.1 SECOND DEGREE HEART BLOCK: ICD-10-CM

## 2025-02-11 DIAGNOSIS — Z95.0 CARDIAC PACEMAKER IN SITU: Primary | ICD-10-CM

## 2025-02-11 DIAGNOSIS — Z95.0 CARDIAC PACEMAKER IN SITU: ICD-10-CM

## 2025-02-11 LAB
MDC_IDC_MSMT_BATTERY_DTM: NORMAL
MDC_IDC_MSMT_BATTERY_IMPEDANCE: 2903 OHM
MDC_IDC_MSMT_BATTERY_REMAINING_LONGEVITY: 31 MO
MDC_IDC_MSMT_BATTERY_STATUS: NORMAL
MDC_IDC_MSMT_BATTERY_VOLTAGE: 2.74 V
MDC_IDC_MSMT_LEADCHNL_RA_IMPEDANCE_VALUE: 497 OHM
MDC_IDC_MSMT_LEADCHNL_RA_PACING_THRESHOLD_AMPLITUDE: 0.38 V
MDC_IDC_MSMT_LEADCHNL_RA_PACING_THRESHOLD_PULSEWIDTH: 0.4 MS
MDC_IDC_MSMT_LEADCHNL_RV_IMPEDANCE_VALUE: 870 OHM
MDC_IDC_MSMT_LEADCHNL_RV_PACING_THRESHOLD_AMPLITUDE: 0.75 V
MDC_IDC_MSMT_LEADCHNL_RV_PACING_THRESHOLD_PULSEWIDTH: 0.4 MS
MDC_IDC_PG_IMPLANT_DTM: NORMAL
MDC_IDC_PG_MFG: NORMAL
MDC_IDC_PG_MODEL: NORMAL
MDC_IDC_PG_SERIAL: NORMAL
MDC_IDC_PG_TYPE: NORMAL
MDC_IDC_SESS_CLINIC_NAME: NORMAL
MDC_IDC_SESS_DTM: NORMAL
MDC_IDC_SESS_TYPE: NORMAL
MDC_IDC_SET_BRADY_AT_MODE_SWITCH_MODE: NORMAL
MDC_IDC_SET_BRADY_AT_MODE_SWITCH_RATE: 175 {BEATS}/MIN
MDC_IDC_SET_BRADY_LOWRATE: 60 {BEATS}/MIN
MDC_IDC_SET_BRADY_MAX_SENSOR_RATE: 150 {BEATS}/MIN
MDC_IDC_SET_BRADY_MAX_TRACKING_RATE: 150 {BEATS}/MIN
MDC_IDC_SET_BRADY_MODE: NORMAL
MDC_IDC_SET_BRADY_PAV_DELAY_LOW: 150 MS
MDC_IDC_SET_BRADY_SAV_DELAY_LOW: 120 MS
MDC_IDC_SET_LEADCHNL_RA_PACING_AMPLITUDE: 1.5 V
MDC_IDC_SET_LEADCHNL_RA_PACING_CAPTURE_MODE: NORMAL
MDC_IDC_SET_LEADCHNL_RA_PACING_POLARITY: NORMAL
MDC_IDC_SET_LEADCHNL_RA_PACING_PULSEWIDTH: 0.4 MS
MDC_IDC_SET_LEADCHNL_RA_SENSING_POLARITY: NORMAL
MDC_IDC_SET_LEADCHNL_RA_SENSING_SENSITIVITY: 0.5 MV
MDC_IDC_SET_LEADCHNL_RV_PACING_AMPLITUDE: 2 V
MDC_IDC_SET_LEADCHNL_RV_PACING_CAPTURE_MODE: NORMAL
MDC_IDC_SET_LEADCHNL_RV_PACING_POLARITY: NORMAL
MDC_IDC_SET_LEADCHNL_RV_PACING_PULSEWIDTH: 0.4 MS
MDC_IDC_SET_LEADCHNL_RV_SENSING_POLARITY: NORMAL
MDC_IDC_SET_LEADCHNL_RV_SENSING_SENSITIVITY: 2 MV
MDC_IDC_SET_ZONE_DETECTION_INTERVAL: 333.33 MS
MDC_IDC_SET_ZONE_DETECTION_INTERVAL: 342.86 MS
MDC_IDC_SET_ZONE_STATUS: NORMAL
MDC_IDC_SET_ZONE_STATUS: NORMAL
MDC_IDC_SET_ZONE_TYPE: NORMAL
MDC_IDC_SET_ZONE_TYPE: NORMAL
MDC_IDC_SET_ZONE_VENDOR_TYPE: NORMAL
MDC_IDC_SET_ZONE_VENDOR_TYPE: NORMAL
MDC_IDC_STAT_AT_BURDEN_PERCENT: 0 %
MDC_IDC_STAT_AT_DTM_END: NORMAL
MDC_IDC_STAT_AT_DTM_START: NORMAL
MDC_IDC_STAT_AT_MODE_SW_COUNT: 0
MDC_IDC_STAT_BRADY_AP_VP_PERCENT: 1 %
MDC_IDC_STAT_BRADY_AP_VS_PERCENT: 56 %
MDC_IDC_STAT_BRADY_AS_VP_PERCENT: 1 %
MDC_IDC_STAT_BRADY_AS_VS_PERCENT: 42 %
MDC_IDC_STAT_BRADY_DTM_END: NORMAL
MDC_IDC_STAT_BRADY_DTM_START: NORMAL
MDC_IDC_STAT_EPISODE_RECENT_COUNT: 0
MDC_IDC_STAT_EPISODE_RECENT_COUNT: 0
MDC_IDC_STAT_EPISODE_RECENT_COUNT_DTM_END: NORMAL
MDC_IDC_STAT_EPISODE_RECENT_COUNT_DTM_END: NORMAL
MDC_IDC_STAT_EPISODE_RECENT_COUNT_DTM_START: NORMAL
MDC_IDC_STAT_EPISODE_RECENT_COUNT_DTM_START: NORMAL
MDC_IDC_STAT_EPISODE_TYPE: NORMAL
MDC_IDC_STAT_EPISODE_TYPE: NORMAL

## 2025-02-11 PROCEDURE — 93296 REM INTERROG EVL PM/IDS: CPT | Performed by: INTERNAL MEDICINE

## 2025-02-11 PROCEDURE — 93294 REM INTERROG EVL PM/LDLS PM: CPT | Performed by: INTERNAL MEDICINE

## 2025-02-11 NOTE — TELEPHONE ENCOUNTER
Returned call to Pt and informed her DR Madden recommends DR Molina, and DR Barbour in [place] for new providers.  JERRY Jacobo RN

## 2025-02-17 ENCOUNTER — TELEPHONE (OUTPATIENT)
Dept: CARDIOLOGY | Facility: CLINIC | Age: 70
End: 2025-02-17
Payer: COMMERCIAL

## 2025-02-17 NOTE — TELEPHONE ENCOUNTER
Returned call Pt can have appt with Dr Molina. Informed them schedules open 3 months ahead. JERRY Jacobo RN   (3) adequate

## 2025-02-17 NOTE — TELEPHONE ENCOUNTER
[facility] Call Center    Phone Message    May a detailed message be left on voicemail: yes     Reason for Call: Other: Pt was wondering if she can transfer care to Dr. Molina. DX not applied for Dr. Molina but pt of Dr. Madden. Please call pt to discuss     Action Taken: TE SENT    Travel Screening: Not Applicable     Date of Service:                                   Thank you!  [facility]

## 2025-02-19 ENCOUNTER — TELEPHONE (OUTPATIENT)
Dept: CARDIOLOGY | Facility: CLINIC | Age: 70
End: 2025-02-19
Payer: COMMERCIAL

## 2025-02-19 NOTE — TELEPHONE ENCOUNTER
1st attempt- Left voicemail for the patient to call back and schedule the following:    Appointment type:  Return Provider Change  Provider:  ANY GENERAL MD  Return date:  Next Available  Additional appointment(s) needed:  No  Additonal Notes:  No  Specialty phone number: [phone]

## 2025-05-20 ENCOUNTER — ANCILLARY PROCEDURE (OUTPATIENT)
Dept: CARDIOLOGY | Facility: CLINIC | Age: 70
End: 2025-05-20
Attending: INTERNAL MEDICINE
Payer: COMMERCIAL

## 2025-05-20 DIAGNOSIS — I44.1 SECOND DEGREE HEART BLOCK: ICD-10-CM

## 2025-05-20 DIAGNOSIS — Z95.0 CARDIAC PACEMAKER IN SITU: ICD-10-CM

## 2025-05-20 LAB
MDC_IDC_MSMT_BATTERY_DTM: NORMAL
MDC_IDC_MSMT_BATTERY_IMPEDANCE: 3147 OHM
MDC_IDC_MSMT_BATTERY_REMAINING_LONGEVITY: 29 MO
MDC_IDC_MSMT_BATTERY_STATUS: NORMAL
MDC_IDC_MSMT_BATTERY_VOLTAGE: 2.73 V
MDC_IDC_MSMT_LEADCHNL_RA_IMPEDANCE_VALUE: 504 OHM
MDC_IDC_MSMT_LEADCHNL_RA_PACING_THRESHOLD_AMPLITUDE: 0.38 V
MDC_IDC_MSMT_LEADCHNL_RA_PACING_THRESHOLD_PULSEWIDTH: 0.4 MS
MDC_IDC_MSMT_LEADCHNL_RV_IMPEDANCE_VALUE: 867 OHM
MDC_IDC_MSMT_LEADCHNL_RV_PACING_THRESHOLD_AMPLITUDE: 0.75 V
MDC_IDC_MSMT_LEADCHNL_RV_PACING_THRESHOLD_PULSEWIDTH: 0.4 MS
MDC_IDC_PG_IMPLANT_DTM: NORMAL
MDC_IDC_PG_MFG: NORMAL
MDC_IDC_PG_MODEL: NORMAL
MDC_IDC_PG_SERIAL: NORMAL
MDC_IDC_PG_TYPE: NORMAL
MDC_IDC_SESS_CLINIC_NAME: NORMAL
MDC_IDC_SESS_DTM: NORMAL
MDC_IDC_SESS_TYPE: NORMAL
MDC_IDC_SET_BRADY_AT_MODE_SWITCH_MODE: NORMAL
MDC_IDC_SET_BRADY_AT_MODE_SWITCH_RATE: 175 {BEATS}/MIN
MDC_IDC_SET_BRADY_LOWRATE: 60 {BEATS}/MIN
MDC_IDC_SET_BRADY_MAX_SENSOR_RATE: 150 {BEATS}/MIN
MDC_IDC_SET_BRADY_MAX_TRACKING_RATE: 150 {BEATS}/MIN
MDC_IDC_SET_BRADY_MODE: NORMAL
MDC_IDC_SET_BRADY_PAV_DELAY_LOW: 150 MS
MDC_IDC_SET_BRADY_SAV_DELAY_LOW: 120 MS
MDC_IDC_SET_LEADCHNL_RA_PACING_AMPLITUDE: 1.5 V
MDC_IDC_SET_LEADCHNL_RA_PACING_CAPTURE_MODE: NORMAL
MDC_IDC_SET_LEADCHNL_RA_PACING_POLARITY: NORMAL
MDC_IDC_SET_LEADCHNL_RA_PACING_PULSEWIDTH: 0.4 MS
MDC_IDC_SET_LEADCHNL_RA_SENSING_POLARITY: NORMAL
MDC_IDC_SET_LEADCHNL_RA_SENSING_SENSITIVITY: 0.5 MV
MDC_IDC_SET_LEADCHNL_RV_PACING_AMPLITUDE: 2 V
MDC_IDC_SET_LEADCHNL_RV_PACING_CAPTURE_MODE: NORMAL
MDC_IDC_SET_LEADCHNL_RV_PACING_POLARITY: NORMAL
MDC_IDC_SET_LEADCHNL_RV_PACING_PULSEWIDTH: 0.4 MS
MDC_IDC_SET_LEADCHNL_RV_SENSING_POLARITY: NORMAL
MDC_IDC_SET_LEADCHNL_RV_SENSING_SENSITIVITY: 2 MV
MDC_IDC_SET_ZONE_DETECTION_INTERVAL: 333.33 MS
MDC_IDC_SET_ZONE_DETECTION_INTERVAL: 342.86 MS
MDC_IDC_SET_ZONE_STATUS: NORMAL
MDC_IDC_SET_ZONE_STATUS: NORMAL
MDC_IDC_SET_ZONE_TYPE: NORMAL
MDC_IDC_SET_ZONE_TYPE: NORMAL
MDC_IDC_SET_ZONE_VENDOR_TYPE: NORMAL
MDC_IDC_SET_ZONE_VENDOR_TYPE: NORMAL
MDC_IDC_STAT_AT_BURDEN_PERCENT: 0 %
MDC_IDC_STAT_AT_DTM_END: NORMAL
MDC_IDC_STAT_AT_DTM_START: NORMAL
MDC_IDC_STAT_AT_MODE_SW_COUNT: 0
MDC_IDC_STAT_BRADY_AP_VP_PERCENT: 1 %
MDC_IDC_STAT_BRADY_AP_VS_PERCENT: 56 %
MDC_IDC_STAT_BRADY_AS_VP_PERCENT: 1 %
MDC_IDC_STAT_BRADY_AS_VS_PERCENT: 42 %
MDC_IDC_STAT_BRADY_DTM_END: NORMAL
MDC_IDC_STAT_BRADY_DTM_START: NORMAL
MDC_IDC_STAT_EPISODE_RECENT_COUNT: 0
MDC_IDC_STAT_EPISODE_RECENT_COUNT: 0
MDC_IDC_STAT_EPISODE_RECENT_COUNT_DTM_END: NORMAL
MDC_IDC_STAT_EPISODE_RECENT_COUNT_DTM_END: NORMAL
MDC_IDC_STAT_EPISODE_RECENT_COUNT_DTM_START: NORMAL
MDC_IDC_STAT_EPISODE_RECENT_COUNT_DTM_START: NORMAL
MDC_IDC_STAT_EPISODE_TYPE: NORMAL
MDC_IDC_STAT_EPISODE_TYPE: NORMAL

## 2025-05-20 PROCEDURE — 93296 REM INTERROG EVL PM/IDS: CPT | Performed by: INTERNAL MEDICINE

## 2025-05-20 PROCEDURE — 93294 REM INTERROG EVL PM/LDLS PM: CPT | Performed by: INTERNAL MEDICINE

## 2025-07-16 ENCOUNTER — ANCILLARY PROCEDURE (OUTPATIENT)
Dept: CARDIOLOGY | Facility: CLINIC | Age: 70
End: 2025-07-16
Payer: COMMERCIAL

## 2025-07-16 DIAGNOSIS — I44.1 SECOND DEGREE HEART BLOCK: ICD-10-CM

## 2025-07-16 DIAGNOSIS — Z95.0 PACEMAKER: ICD-10-CM

## 2025-07-16 LAB
MDC_IDC_MSMT_BATTERY_DTM: NORMAL
MDC_IDC_MSMT_BATTERY_IMPEDANCE: 3228 OHM
MDC_IDC_MSMT_BATTERY_REMAINING_LONGEVITY: 27 MO
MDC_IDC_MSMT_BATTERY_STATUS: NORMAL
MDC_IDC_MSMT_BATTERY_VOLTAGE: 2.72 V
MDC_IDC_MSMT_LEADCHNL_RA_IMPEDANCE_VALUE: 464 OHM
MDC_IDC_MSMT_LEADCHNL_RA_PACING_THRESHOLD_AMPLITUDE: 0.25 V
MDC_IDC_MSMT_LEADCHNL_RA_PACING_THRESHOLD_AMPLITUDE: 0.38 V
MDC_IDC_MSMT_LEADCHNL_RA_PACING_THRESHOLD_PULSEWIDTH: 0.4 MS
MDC_IDC_MSMT_LEADCHNL_RA_PACING_THRESHOLD_PULSEWIDTH: 0.4 MS
MDC_IDC_MSMT_LEADCHNL_RA_SENSING_INTR_AMPL: 4 MV
MDC_IDC_MSMT_LEADCHNL_RV_IMPEDANCE_VALUE: 810 OHM
MDC_IDC_MSMT_LEADCHNL_RV_PACING_THRESHOLD_AMPLITUDE: 0.75 V
MDC_IDC_MSMT_LEADCHNL_RV_PACING_THRESHOLD_AMPLITUDE: 0.75 V
MDC_IDC_MSMT_LEADCHNL_RV_PACING_THRESHOLD_PULSEWIDTH: 0.4 MS
MDC_IDC_MSMT_LEADCHNL_RV_PACING_THRESHOLD_PULSEWIDTH: 0.4 MS
MDC_IDC_MSMT_LEADCHNL_RV_SENSING_INTR_AMPL: 5.6 MV
MDC_IDC_PG_IMPLANT_DTM: NORMAL
MDC_IDC_PG_MFG: NORMAL
MDC_IDC_PG_MODEL: NORMAL
MDC_IDC_PG_SERIAL: NORMAL
MDC_IDC_PG_TYPE: NORMAL
MDC_IDC_SESS_CLINIC_NAME: NORMAL
MDC_IDC_SESS_DTM: NORMAL
MDC_IDC_SESS_TYPE: NORMAL
MDC_IDC_SET_BRADY_AT_MODE_SWITCH_MODE: NORMAL
MDC_IDC_SET_BRADY_AT_MODE_SWITCH_RATE: 175 {BEATS}/MIN
MDC_IDC_SET_BRADY_LOWRATE: 60 {BEATS}/MIN
MDC_IDC_SET_BRADY_MAX_SENSOR_RATE: 150 {BEATS}/MIN
MDC_IDC_SET_BRADY_MAX_TRACKING_RATE: 150 {BEATS}/MIN
MDC_IDC_SET_BRADY_MODE: NORMAL
MDC_IDC_SET_BRADY_PAV_DELAY_LOW: 150 MS
MDC_IDC_SET_BRADY_SAV_DELAY_LOW: 120 MS
MDC_IDC_SET_LEADCHNL_RA_PACING_AMPLITUDE: 1.5 V
MDC_IDC_SET_LEADCHNL_RA_PACING_CAPTURE_MODE: NORMAL
MDC_IDC_SET_LEADCHNL_RA_PACING_POLARITY: NORMAL
MDC_IDC_SET_LEADCHNL_RA_PACING_PULSEWIDTH: 0.4 MS
MDC_IDC_SET_LEADCHNL_RA_SENSING_POLARITY: NORMAL
MDC_IDC_SET_LEADCHNL_RA_SENSING_SENSITIVITY: 0.5 MV
MDC_IDC_SET_LEADCHNL_RV_PACING_AMPLITUDE: 2 V
MDC_IDC_SET_LEADCHNL_RV_PACING_CAPTURE_MODE: NORMAL
MDC_IDC_SET_LEADCHNL_RV_PACING_POLARITY: NORMAL
MDC_IDC_SET_LEADCHNL_RV_PACING_PULSEWIDTH: 0.4 MS
MDC_IDC_SET_LEADCHNL_RV_SENSING_POLARITY: NORMAL
MDC_IDC_SET_LEADCHNL_RV_SENSING_SENSITIVITY: 2 MV
MDC_IDC_SET_ZONE_DETECTION_INTERVAL: 333.33 MS
MDC_IDC_SET_ZONE_DETECTION_INTERVAL: 342.86 MS
MDC_IDC_SET_ZONE_STATUS: NORMAL
MDC_IDC_SET_ZONE_STATUS: NORMAL
MDC_IDC_SET_ZONE_TYPE: NORMAL
MDC_IDC_SET_ZONE_TYPE: NORMAL
MDC_IDC_SET_ZONE_VENDOR_TYPE: NORMAL
MDC_IDC_SET_ZONE_VENDOR_TYPE: NORMAL
MDC_IDC_STAT_AT_BURDEN_PERCENT: 0 %
MDC_IDC_STAT_AT_DTM_END: NORMAL
MDC_IDC_STAT_AT_DTM_START: NORMAL
MDC_IDC_STAT_AT_MODE_SW_COUNT: 0
MDC_IDC_STAT_BRADY_AP_VP_PERCENT: 1 %
MDC_IDC_STAT_BRADY_AP_VS_PERCENT: 56 %
MDC_IDC_STAT_BRADY_AS_VP_PERCENT: 1 %
MDC_IDC_STAT_BRADY_AS_VS_PERCENT: 42 %
MDC_IDC_STAT_BRADY_DTM_END: NORMAL
MDC_IDC_STAT_BRADY_DTM_START: NORMAL
MDC_IDC_STAT_EPISODE_RECENT_COUNT: 0
MDC_IDC_STAT_EPISODE_RECENT_COUNT: 0
MDC_IDC_STAT_EPISODE_RECENT_COUNT_DTM_END: NORMAL
MDC_IDC_STAT_EPISODE_RECENT_COUNT_DTM_END: NORMAL
MDC_IDC_STAT_EPISODE_RECENT_COUNT_DTM_START: NORMAL
MDC_IDC_STAT_EPISODE_RECENT_COUNT_DTM_START: NORMAL
MDC_IDC_STAT_EPISODE_TYPE: NORMAL
MDC_IDC_STAT_EPISODE_TYPE: NORMAL

## 2025-07-16 PROCEDURE — 93280 PM DEVICE PROGR EVAL DUAL: CPT | Performed by: INTERNAL MEDICINE

## 2025-09-03 ENCOUNTER — OFFICE VISIT (OUTPATIENT)
Dept: CARDIOLOGY | Facility: CLINIC | Age: 70
End: 2025-09-03
Attending: INTERNAL MEDICINE
Payer: COMMERCIAL

## 2025-09-03 VITALS
WEIGHT: 202.1 LBS | OXYGEN SATURATION: 97 % | SYSTOLIC BLOOD PRESSURE: 110 MMHG | HEART RATE: 54 BPM | BODY MASS INDEX: 35.8 KG/M2 | DIASTOLIC BLOOD PRESSURE: 66 MMHG

## 2025-09-03 DIAGNOSIS — Z13.6 SCREENING FOR HEART DISEASE: Primary | ICD-10-CM

## 2025-09-03 DIAGNOSIS — Z95.0 CARDIAC PACEMAKER IN SITU: ICD-10-CM

## 2025-09-03 DIAGNOSIS — I49.3 PVC'S (PREMATURE VENTRICULAR CONTRACTIONS): ICD-10-CM

## 2025-09-03 DIAGNOSIS — I44.1 SECOND DEGREE HEART BLOCK: ICD-10-CM
